# Patient Record
Sex: FEMALE | Race: BLACK OR AFRICAN AMERICAN | NOT HISPANIC OR LATINO | Employment: PART TIME | URBAN - METROPOLITAN AREA
[De-identification: names, ages, dates, MRNs, and addresses within clinical notes are randomized per-mention and may not be internally consistent; named-entity substitution may affect disease eponyms.]

---

## 2017-06-26 ENCOUNTER — HISTORICAL (OUTPATIENT)
Dept: LAB | Facility: HOSPITAL | Age: 22
End: 2017-06-26

## 2017-08-23 LAB
GLUCOSE UR QL STRIP: NEGATIVE
PROTEIN, POC: NEGATIVE

## 2018-07-19 ENCOUNTER — HISTORICAL (OUTPATIENT)
Dept: RADIOLOGY | Facility: HOSPITAL | Age: 23
End: 2018-07-19

## 2022-04-09 ENCOUNTER — HISTORICAL (OUTPATIENT)
Dept: ADMINISTRATIVE | Facility: HOSPITAL | Age: 27
End: 2022-04-09

## 2022-04-25 VITALS
WEIGHT: 185 LBS | SYSTOLIC BLOOD PRESSURE: 108 MMHG | HEIGHT: 65 IN | DIASTOLIC BLOOD PRESSURE: 66 MMHG | BODY MASS INDEX: 30.82 KG/M2

## 2022-09-15 ENCOUNTER — HISTORICAL (OUTPATIENT)
Dept: ADMINISTRATIVE | Facility: HOSPITAL | Age: 27
End: 2022-09-15

## 2022-10-12 ENCOUNTER — LAB VISIT (OUTPATIENT)
Dept: LAB | Facility: HOSPITAL | Age: 27
End: 2022-10-12
Attending: OBSTETRICS & GYNECOLOGY
Payer: MEDICAID

## 2022-10-12 DIAGNOSIS — Z20.2 POSSIBLE EXPOSURE TO STD: ICD-10-CM

## 2022-10-12 LAB
HAV IGM SERPL QL IA: NONREACTIVE
HBV CORE IGM SERPL QL IA: NONREACTIVE
HBV SURFACE AG SERPL QL IA: NONREACTIVE
HCV AB SERPL QL IA: NONREACTIVE
HIV 1+2 AB+HIV1 P24 AG SERPL QL IA: NONREACTIVE
T PALLIDUM AB SER QL: NONREACTIVE

## 2022-10-12 PROCEDURE — 36415 COLL VENOUS BLD VENIPUNCTURE: CPT

## 2022-10-12 PROCEDURE — 87389 HIV-1 AG W/HIV-1&-2 AB AG IA: CPT

## 2022-10-12 PROCEDURE — 86780 TREPONEMA PALLIDUM: CPT

## 2022-10-12 PROCEDURE — 80074 ACUTE HEPATITIS PANEL: CPT

## 2022-10-14 LAB — PATH REV: NORMAL

## 2022-11-10 ENCOUNTER — HOSPITAL ENCOUNTER (OUTPATIENT)
Dept: RADIOLOGY | Facility: HOSPITAL | Age: 27
Discharge: HOME | End: 2022-11-10
Attending: OBSTETRICS & GYNECOLOGY
Payer: MEDICAID

## 2022-11-10 VITALS — HEIGHT: 65 IN | WEIGHT: 208 LBS | BODY MASS INDEX: 34.66 KG/M2

## 2022-11-10 DIAGNOSIS — N64.4 BREAST PAIN, LEFT: ICD-10-CM

## 2022-11-10 PROCEDURE — 76642 ULTRASOUND BREAST LIMITED: CPT | Mod: TC,50

## 2022-11-10 PROCEDURE — 76642 ULTRASOUND BREAST LIMITED: CPT | Mod: 26,50,, | Performed by: STUDENT IN AN ORGANIZED HEALTH CARE EDUCATION/TRAINING PROGRAM

## 2022-11-10 PROCEDURE — 76642 US BREAST BILATERAL LIMITED: ICD-10-PCS | Mod: 26,50,, | Performed by: STUDENT IN AN ORGANIZED HEALTH CARE EDUCATION/TRAINING PROGRAM

## 2023-01-16 ENCOUNTER — LAB VISIT (OUTPATIENT)
Dept: LAB | Facility: HOSPITAL | Age: 28
End: 2023-01-16
Attending: OBSTETRICS & GYNECOLOGY
Payer: MEDICAID

## 2023-01-16 DIAGNOSIS — Z03.89 ENCNTR FOR OBS FOR OTH SUSPECTED DISEASES AND COND RULED OUT: ICD-10-CM

## 2023-01-16 DIAGNOSIS — O09.92 ENCOUNTER FOR SUPERVISION OF HIGH RISK PREGNANCY IN SECOND TRIMESTER, ANTEPARTUM: ICD-10-CM

## 2023-01-16 LAB
AMPHET UR QL SCN: NEGATIVE
BARBITURATE SCN PRESENT UR: NEGATIVE
BASOPHILS # BLD AUTO: 0.02 X10(3)/MCL (ref 0–0.2)
BASOPHILS NFR BLD AUTO: 0.2 %
BENZODIAZ UR QL SCN: NEGATIVE
CANNABINOIDS UR QL SCN: NEGATIVE
COCAINE UR QL SCN: NEGATIVE
EOSINOPHIL # BLD AUTO: 0.12 X10(3)/MCL (ref 0–0.9)
EOSINOPHIL NFR BLD AUTO: 1.3 %
ERYTHROCYTE [DISTWIDTH] IN BLOOD BY AUTOMATED COUNT: 12.9 % (ref 11.5–17)
GROUP & RH: NORMAL
HBV SURFACE AG SERPL QL IA: NONREACTIVE
HCT VFR BLD AUTO: 39.1 % (ref 37–47)
HCV AB SERPL QL IA: NONREACTIVE
HGB BLD-MCNC: 12.4 GM/DL (ref 12–16)
HIV 1+2 AB+HIV1 P24 AG SERPL QL IA: NONREACTIVE
IMM GRANULOCYTES # BLD AUTO: 0.03 X10(3)/MCL (ref 0–0.04)
IMM GRANULOCYTES NFR BLD AUTO: 0.3 %
INDIRECT COOMBS GEL: NORMAL
LYMPHOCYTES # BLD AUTO: 1.66 X10(3)/MCL (ref 0.6–4.6)
LYMPHOCYTES NFR BLD AUTO: 18.6 %
MCH RBC QN AUTO: 30.5 PG
MCHC RBC AUTO-ENTMCNC: 31.7 MG/DL (ref 33–36)
MCV RBC AUTO: 96.3 FL (ref 80–94)
MONOCYTES # BLD AUTO: 0.58 X10(3)/MCL (ref 0.1–1.3)
MONOCYTES NFR BLD AUTO: 6.5 %
NEUTROPHILS # BLD AUTO: 6.52 X10(3)/MCL (ref 2.1–9.2)
NEUTROPHILS NFR BLD AUTO: 73.1 %
OPIATES UR QL SCN: NEGATIVE
PCP UR QL: NEGATIVE
PH UR: 6 [PH] (ref 3–11)
PLATELET # BLD AUTO: 255 X10(3)/MCL (ref 130–400)
PMV BLD AUTO: 9.8 FL (ref 7.4–10.4)
RBC # BLD AUTO: 4.06 X10(6)/MCL (ref 4.2–5.4)
T PALLIDUM AB SER QL: NONREACTIVE
T4 FREE SERPL-MCNC: 0.91 NG/DL (ref 0.7–1.48)
TSH SERPL-ACNC: 0.49 UIU/ML (ref 0.35–4.94)
WBC # SPEC AUTO: 8.9 X10(3)/MCL (ref 4.5–11.5)

## 2023-01-16 PROCEDURE — 80307 DRUG TEST PRSMV CHEM ANLYZR: CPT

## 2023-01-16 PROCEDURE — 84439 ASSAY OF FREE THYROXINE: CPT

## 2023-01-16 PROCEDURE — 83789 MASS SPECTROMETRY QUAL/QUAN: CPT

## 2023-01-16 PROCEDURE — 86900 BLOOD TYPING SEROLOGIC ABO: CPT | Performed by: OBSTETRICS & GYNECOLOGY

## 2023-01-16 PROCEDURE — 83020 HEMOGLOBIN ELECTROPHORESIS: CPT

## 2023-01-16 PROCEDURE — 86787 VARICELLA-ZOSTER ANTIBODY: CPT

## 2023-01-16 PROCEDURE — 86762 RUBELLA ANTIBODY: CPT

## 2023-01-16 PROCEDURE — 87340 HEPATITIS B SURFACE AG IA: CPT

## 2023-01-16 PROCEDURE — 87389 HIV-1 AG W/HIV-1&-2 AB AG IA: CPT

## 2023-01-16 PROCEDURE — 84443 ASSAY THYROID STIM HORMONE: CPT

## 2023-01-16 PROCEDURE — 85025 COMPLETE CBC W/AUTO DIFF WBC: CPT

## 2023-01-16 PROCEDURE — 86803 HEPATITIS C AB TEST: CPT

## 2023-01-16 PROCEDURE — 36415 COLL VENOUS BLD VENIPUNCTURE: CPT

## 2023-01-16 PROCEDURE — 86780 TREPONEMA PALLIDUM: CPT

## 2023-01-16 PROCEDURE — 82664 ELECTROPHORETIC TEST: CPT

## 2023-01-17 LAB
RUBV IGG SERPL IA-ACNC: 0.6
RUBV IGG SERPL QL IA: NEGATIVE
VZV IGG SER IA-ACNC: 2.3
VZV IGG SER QL IA: POSITIVE

## 2023-01-18 LAB
HGB A MFR BLD ELPH: 96.8 % (ref 95.8–98)
HGB A2 MFR BLD: 2.7 % (ref 2–3.3)
HGB F MFR BLD: 0.5 % (ref 0–0.9)
HGB FRACT BLD ELPH-IMP: NORMAL
M HPLC HB VARIANT, B: NORMAL

## 2023-02-22 DIAGNOSIS — M79.662 BILATERAL CALF PAIN: Primary | ICD-10-CM

## 2023-02-22 DIAGNOSIS — M79.661 BILATERAL CALF PAIN: Primary | ICD-10-CM

## 2023-02-22 DIAGNOSIS — O09.92 SUPERVISION OF HIGH RISK PREGNANCY IN SECOND TRIMESTER: ICD-10-CM

## 2023-02-24 ENCOUNTER — HOSPITAL ENCOUNTER (OUTPATIENT)
Dept: CARDIOLOGY | Facility: HOSPITAL | Age: 28
Discharge: HOME | End: 2023-02-24
Attending: OBSTETRICS & GYNECOLOGY
Payer: MEDICAID

## 2023-02-24 DIAGNOSIS — M79.661 BILATERAL CALF PAIN: ICD-10-CM

## 2023-02-24 DIAGNOSIS — M79.662 BILATERAL CALF PAIN: ICD-10-CM

## 2023-02-24 DIAGNOSIS — O09.92 SUPERVISION OF HIGH RISK PREGNANCY IN SECOND TRIMESTER: ICD-10-CM

## 2023-02-24 PROCEDURE — 93971 EXTREMITY STUDY: CPT | Mod: LT

## 2023-03-30 ENCOUNTER — HOSPITAL ENCOUNTER (EMERGENCY)
Facility: HOSPITAL | Age: 28
Discharge: HOME | End: 2023-03-30
Attending: SPECIALIST
Payer: MEDICAID

## 2023-03-30 VITALS — BODY MASS INDEX: 35.16 KG/M2 | WEIGHT: 211 LBS | HEIGHT: 65 IN | TEMPERATURE: 98 F

## 2023-03-30 PROCEDURE — 99283 EMERGENCY DEPT VISIT LOW MDM: CPT

## 2023-03-30 RX ORDER — ASPIRIN 81 MG/1
81 TABLET ORAL DAILY
COMMUNITY

## 2023-03-30 NOTE — ED PROVIDER NOTES
"       MADDIE NOTE     Admit Date: 3/30/2023  MADDIE Physician: Lewis Tierney  Primary OBGYN: Dr. Lewis Knight    Admit Diagnosis/Chief Complaint:       Chief Complaint   Patient presents with    pelvic pain    Vaginal Pain       Hospital Course:  Arabella Toribio is a 27 y.o.  at 21w2d presents complaining of lower abdominal pressure pain shooting in the vagina area.  Also some mucousy discharge.  Patient was concerned because she is had previous  delivery    This IUP is complicated by history of  delivery.    Patient denies vaginal bleeding, leakage of fluid, contractions, headache, vision changes, RUQ pain, dysuria, fever, and nausea/vomiting.  Fetal Movement: normal.      Temp 98.3 °F (36.8 °C) (Oral)   Ht 5' 5" (1.651 m)   Wt 95.7 kg (211 lb)   LMP 10/22/2022   Breastfeeding No   BMI 35.11 kg/m²   Temp:  [98.3 °F (36.8 °C)] 98.3 °F (36.8 °C)    General: in no respiratory distress and acyanotic  Cardiovascular: regular rate and rhythm no murmurs  Respiratory: clear to auscultation, no wheezes, rales or rhonchi, symmetric air entry  Abdominal: fundus soft, mild tenderness in the suprapubic area consistent with round ligament pain 21 weeks size  Back: lumbar tenderness absent CVA tenderness none  Extremeties no redness or tenderness in the calves or thighs no edema    SSE:   SVE:  Cervix is long thick closed and posterior      FHT: Category 1  TOCO:  No contractions are noted    Medical Decision Making:  Patient is reassured.  Description of normal expected discomforts in pregnancy versus signs and symptoms of  labor have been discussed.  Questions were answered to her satisfaction.    LABS:   No results found for this or any previous visit (from the past 24 hour(s)).  [unfilled]     Imaging Results    None          ASSESMENT: Arabella Toribio is a 27 y.o.   at 21w2d with abdominal pain or round ligament pain of pregnancy    Discharge Diagnosis/Clinical " Impression:  Pregnancy 21 weeks.  Abdominal pain or round ligament pain    Status:Improved/stable    Disposition:  discharged to home    Medications:   Tylenol for discomfort    Patient Instructions:   Current Discharge Medication List        CONTINUE these medications which have NOT CHANGED    Details   aspirin (ECOTRIN) 81 MG EC tablet Take 81 mg by mouth once daily.      prenatal 93-iron-folate 9-dha (TRISTART DHA) 31 mg iron- 1 mg-200 mg Cap Take 1 capsule daily  Qty: 30 capsule, Refills: 11    Associated Diagnoses: Encounter for supervision of high risk pregnancy in second trimester, antepartum; Encntr for obs for oth suspected diseases and cond ruled out      promethazine (PHENERGAN) 25 MG suppository Place 1 suppository (25 mg total) rectally every 6 (six) hours as needed for Nausea.  Qty: 12 suppository, Refills: 1    Associated Diagnoses: Encounter for supervision of high risk pregnancy in second trimester, antepartum      boric acid (PH-D) 600 mg vaginal suppository Insert 1 supp. Vaginally every night at bedtime for 7 days and then insert 1 supp. Vaginally twice weekly as needed  Qty: 30 suppository, Refills: 6    Associated Diagnoses: BV (bacterial vaginosis)      metroNIDAZOLE (METROGEL) 0.75 % (37.5mg/5 gram) vaginal gel Place 1 applicator vaginally every evening.  Qty: 70 g, Refills: 0    Associated Diagnoses: BV (bacterial vaginosis)      valACYclovir (VALTREX) 500 MG tablet Take 1 tablet (500 mg total) by mouth 2 (two) times daily. for 3 days  Qty: 6 tablet, Refills: 0    Associated Diagnoses: HSV infection             - Pt was given routine pregnancy instructions including to return to triage if she had any vaginal bleeding (other than spotting for the next 48hrs), any loss of fluid like her water broke, decreased fetal movement, or contractions Q 5min lasting for 2 or more hours. Pt was also instructed to drink copious water. Patient voiced understanding of all these instructions and was  subsequently discharged home.    She will follow up with her primary OB.    No discharge procedures on file.    Lewis Tierney MD  OB-GYN Hospitalist       Lewis Tierney MD  03/30/23 [id number]

## 2023-03-30 NOTE — ED TRIAGE NOTES
IUP 21.2 weeks here with complaints of pelvic pain and vaginal pain that started 2 days ago that comes and goes and is sharp in the vagina - Patient denies vaginal bleeding or leaking fluids -Patient states she is having some white/clear discharge - Patient is having +FM

## 2023-03-30 NOTE — DISCHARGE INSTRUCTIONS
Come back to labor and delivery if you are having any vaginal bleeding, leaking fluid, contractions or decreased fetal movement.

## 2023-05-26 ENCOUNTER — HOSPITAL ENCOUNTER (EMERGENCY)
Facility: HOSPITAL | Age: 28
Discharge: HOME | End: 2023-05-26
Attending: SPECIALIST
Payer: MEDICAID

## 2023-05-26 VITALS
TEMPERATURE: 98 F | DIASTOLIC BLOOD PRESSURE: 67 MMHG | SYSTOLIC BLOOD PRESSURE: 116 MMHG | HEART RATE: 98 BPM | OXYGEN SATURATION: 95 %

## 2023-05-26 PROCEDURE — 99284 EMERGENCY DEPT VISIT MOD MDM: CPT

## 2023-05-26 RX ORDER — PROGESTERONE 200 MG/1
200 CAPSULE ORAL NIGHTLY
COMMUNITY
Start: 2023-05-17

## 2023-05-26 NOTE — ED PROVIDER NOTES
MADDIE NOTE     Admit Date: 2023  MADDIE Physician: Lewis Tierney  Primary OBGYN: Dr. Lewis Knight    Admit Diagnosis/Chief Complaint:       No chief complaint on file.      Hospital Course:  Arabella Toribio is a 27 y.o.  at 29w3d presents complaining of lower abdominal pain shooting into the vaginal area and low back pain and cramping.  Patient reports an occasional tightening of her abdomen but no regular pattern.  She reports that this is infrequent.  She denies any other problems at this time.    This IUP is complicated by history of  labor at 29 weeks.    Patient denies vaginal bleeding, leakage of fluid, headache, vision changes, RUQ pain, dysuria, fever, and nausea/vomiting.  Fetal Movement: normal.      /67   Pulse 106   LMP 10/22/2022   SpO2 99%   Pulse:  [106] 106  SpO2:  [99 %] 99 %  BP: (116)/(67) 116/67    General: in no apparent distress in no respiratory distress and acyanotic  Cardiovascular: regular rate and rhythm no murmurs  Respiratory: clear to auscultation, no wheezes, rales or rhonchi, symmetric air entry  Abdominal: fundus soft, nontender except for suprapubic area consistent with fetal presenting part.  Thirty weeks size  Back: lumbar tenderness present CVA tenderness none  Extremeties no redness or tenderness in the calves or thighs no edema    SSE:   SVE:  Closed, 30% effaced, posterior, membranes intact      FHT: Category 1  TOCO:  No contractions apparent    Medical Decision Making:  Patient will be placed on continuous fetal monitoring.  If no contractions and reassuring fetal heart rate tracing we will allow her to be discharged home with precautions.    LABS:   No results found for this or any previous visit (from the past 24 hour(s)).  [unfilled]     Imaging Results    None          ASSESMENT: Arabella Toribio is a 27 y.o.   at 29w3d with 3rd trimester pregnancy discomforts consistent with lower abdominal pain and low back  pain.    Discharge Diagnosis/Clinical Impression:  Pregnancy 29 weeks.  Abdominal pain of pregnancy 3rd trimester.  Back pain of pregnancy 3rd trimester.    Status:Improved/stable    Disposition:  discharged to home    Medications:       Patient Instructions:   Current Discharge Medication List        CONTINUE these medications which have NOT CHANGED    Details   aspirin (ECOTRIN) 81 MG EC tablet Take 81 mg by mouth once daily.      boric acid (PH-D) 600 mg vaginal suppository Insert 1 supp. Vaginally every night at bedtime for 7 days and then insert 1 supp. Vaginally twice weekly as needed  Qty: 30 suppository, Refills: 6    Associated Diagnoses: BV (bacterial vaginosis)      metroNIDAZOLE (METROGEL) 0.75 % (37.5mg/5 gram) vaginal gel Place 1 applicator vaginally every evening.  Qty: 70 g, Refills: 0    Associated Diagnoses: BV (bacterial vaginosis)      prenatal 93-iron-folate 9-dha (TRISTART DHA) 31 mg iron- 1 mg-200 mg Cap Take 1 capsule daily  Qty: 30 capsule, Refills: 11    Associated Diagnoses: Encounter for supervision of high risk pregnancy in second trimester, antepartum; Encntr for obs for oth suspected diseases and cond ruled out      promethazine (PHENERGAN) 25 MG suppository Place 1 suppository (25 mg total) rectally every 6 (six) hours as needed for Nausea.  Qty: 12 suppository, Refills: 1    Associated Diagnoses: Encounter for supervision of high risk pregnancy in second trimester, antepartum      valACYclovir (VALTREX) 500 MG tablet Take 1 tablet (500 mg total) by mouth 2 (two) times daily. for 3 days  Qty: 6 tablet, Refills: 0    Associated Diagnoses: HSV infection             - Pt was given routine pregnancy instructions including to return to triage if she had any vaginal bleeding (other than spotting for the next 48hrs), any loss of fluid like her water broke, decreased fetal movement, or contractions Q 5min lasting for 2 or more hours. Pt was also instructed to drink copious water. Patient  voiced understanding of all these instructions and was subsequently discharged home.    She will follow up with her primary OB.    No discharge procedures on file.    Lewis Tierney MD  OB-GYN Hospitalist       Lewis Tierney MD  05/26/23 [id number]

## 2023-06-08 ENCOUNTER — HOSPITAL ENCOUNTER (EMERGENCY)
Facility: HOSPITAL | Age: 28
Discharge: HOME | End: 2023-06-08
Attending: SPECIALIST
Payer: MEDICAID

## 2023-06-08 VITALS
DIASTOLIC BLOOD PRESSURE: 65 MMHG | SYSTOLIC BLOOD PRESSURE: 125 MMHG | RESPIRATION RATE: 16 BRPM | OXYGEN SATURATION: 97 % | HEART RATE: 115 BPM | TEMPERATURE: 98 F

## 2023-06-08 PROCEDURE — 99284 EMERGENCY DEPT VISIT MOD MDM: CPT

## 2023-06-08 NOTE — ED PROVIDER NOTES
MADDIE NOTE     Admit Date: 2023  MADDIE Physician: Lewis Tierney  Primary OBGYN: Dr. Lewis Knight    Admit Diagnosis/Chief Complaint:       Chief Complaint   Patient presents with    Vaginal Bleeding     Patient arrives with compliant of vaginal bleeding that started this morning at 0641.       Hospital Course:  Arabella Toribio is a 27 y.o.  at 31w2d presents complaining of episode of vaginal bleeding that occurred earlier this morning when she was wiping herself.  No bleeding since that time.  Patient denies any other problems at this time.    This IUP is complicated by history of  delivery at 29 weeks.    Patient denies leakage of fluid, contractions, headache, vision changes, RUQ pain, dysuria, fever, and nausea/vomiting.  Fetal Movement: normal.      /65   Pulse (!) 115   Temp 98.2 °F (36.8 °C)   Resp 16   LMP 10/22/2022   SpO2 97%   Temp:  [98.2 °F (36.8 °C)] 98.2 °F (36.8 °C)  Pulse:  [115] 115  Resp:  [16] 16  SpO2:  [97 %] 97 %  BP: (125)/(65) 125/65    General: in no respiratory distress and acyanotic  Cardiovascular: regular rate and rhythm no murmurs  Respiratory: clear to auscultation, no wheezes, rales or rhonchi, symmetric air entry  Abdominal: fundus soft, nontender 32 weeks size  Back: lumbar tenderness present CVA tenderness none  Extremeties no redness or tenderness in the calves or thighs no edema    SSE:   SVE:  External vulva with no apparent lesions or bleeding.  Slightly erythematous.  Vaginal exam reveals a white clumpy discharge but no evidence of any discoloration consistent with previous or active bleeding.  Excess long thick closed and posterior.      FHT: Category 1  TOCO:  No contractions.    Medical Decision Making:  Bleeding is likely from external source.  No evidence of intravaginal origin of bleeding.  Patient has been reassured and precautions were discussed.  Treatment of a yeast infection was also discussed.  Questions were answered to her  satisfaction.    LABS:   No results found for this or any previous visit (from the past 24 hour(s)).  [unfilled]     Imaging Results    None          ASSESMENT: Arabella Toribio is a 27 y.o.   at 31w2d with vaginal spotting probably secondary to yeast vulvovaginitis.  No evidence of active bleeding.    Discharge Diagnosis/Clinical Impression:  Pregnancy 31 weeks.  Spotting during 3rd trimester pregnancy.  Yeast vulvovaginitis.    Status:Improved/stable    Disposition:  discharged to home    Medications:   Over-the-counter yeast medication    Patient Instructions:   Current Discharge Medication List        CONTINUE these medications which have NOT CHANGED    Details   aspirin (ECOTRIN) 81 MG EC tablet Take 81 mg by mouth once daily.      prenatal 93-iron-folate 9-dha (TRISTART DHA) 31 mg iron- 1 mg-200 mg Cap Take 1 capsule daily  Qty: 30 capsule, Refills: 11    Associated Diagnoses: Encounter for supervision of high risk pregnancy in second trimester, antepartum; Encntr for obs for oth suspected diseases and cond ruled out      boric acid (PH-D) 600 mg vaginal suppository Insert 1 supp. Vaginally every night at bedtime for 7 days and then insert 1 supp. Vaginally twice weekly as needed  Qty: 30 suppository, Refills: 6    Associated Diagnoses: BV (bacterial vaginosis)      metroNIDAZOLE (METROGEL) 0.75 % (37.5mg/5 gram) vaginal gel Place 1 applicator vaginally every evening.  Qty: 70 g, Refills: 0    Associated Diagnoses: BV (bacterial vaginosis)      progesterone (PROMETRIUM) 200 MG capsule Take 200 mg by mouth every evening.      promethazine (PHENERGAN) 25 MG suppository Place 1 suppository (25 mg total) rectally every 6 (six) hours as needed for Nausea.  Qty: 12 suppository, Refills: 1    Associated Diagnoses: Encounter for supervision of high risk pregnancy in second trimester, antepartum      valACYclovir (VALTREX) 500 MG tablet Take 1 tablet (500 mg total) by mouth 2 (two) times daily.  for 3 days  Qty: 6 tablet, Refills: 0    Associated Diagnoses: HSV infection             - Pt was given routine pregnancy instructions including to return to triage if she had any vaginal bleeding (other than spotting for the next 48hrs), any loss of fluid like her water broke, decreased fetal movement, or contractions Q 5min lasting for 2 or more hours. Pt was also instructed to drink copious water. Patient voiced understanding of all these instructions and was subsequently discharged home.    She will follow up with her primary OB.    No discharge procedures on file.    Lewis Tierney MD  OB-GYN Hospitalist       Lewis Tierney MD  06/08/23 [id number]

## 2023-06-08 NOTE — DISCHARGE INSTRUCTIONS
Patient discharged home with instructions to follow up with  @ next scheduled visit. Patient to use over the counter Monistat to treat yeast infection.

## 2023-07-24 ENCOUNTER — LAB VISIT (OUTPATIENT)
Dept: LAB | Facility: HOSPITAL | Age: 28
End: 2023-07-24
Attending: OBSTETRICS & GYNECOLOGY
Payer: MEDICAID

## 2023-07-24 DIAGNOSIS — Z34.83 ENCOUNTER FOR SUPERVISION OF NORMAL PREGNANCY IN MULTIGRAVIDA IN THIRD TRIMESTER: ICD-10-CM

## 2023-07-24 LAB
BASOPHILS # BLD AUTO: 0.03 X10(3)/MCL
BASOPHILS NFR BLD AUTO: 0.4 %
EOSINOPHIL # BLD AUTO: 0.12 X10(3)/MCL (ref 0–0.9)
EOSINOPHIL NFR BLD AUTO: 1.5 %
ERYTHROCYTE [DISTWIDTH] IN BLOOD BY AUTOMATED COUNT: 14.3 % (ref 11.5–17)
HCT VFR BLD AUTO: 37.3 % (ref 37–47)
HGB BLD-MCNC: 12.1 G/DL (ref 12–16)
HIV 1+2 AB+HIV1 P24 AG SERPL QL IA: NONREACTIVE
IMM GRANULOCYTES # BLD AUTO: 0.02 X10(3)/MCL (ref 0–0.04)
IMM GRANULOCYTES NFR BLD AUTO: 0.2 %
LYMPHOCYTES # BLD AUTO: 1.84 X10(3)/MCL (ref 0.6–4.6)
LYMPHOCYTES NFR BLD AUTO: 22.4 %
MCH RBC QN AUTO: 31.3 PG (ref 27–31)
MCHC RBC AUTO-ENTMCNC: 32.4 G/DL (ref 33–36)
MCV RBC AUTO: 96.6 FL (ref 80–94)
MONOCYTES # BLD AUTO: 0.73 X10(3)/MCL (ref 0.1–1.3)
MONOCYTES NFR BLD AUTO: 8.9 %
NEUTROPHILS # BLD AUTO: 5.47 X10(3)/MCL (ref 2.1–9.2)
NEUTROPHILS NFR BLD AUTO: 66.6 %
PLATELET # BLD AUTO: 205 X10(3)/MCL (ref 130–400)
PMV BLD AUTO: 11.8 FL (ref 7.4–10.4)
RBC # BLD AUTO: 3.86 X10(6)/MCL (ref 4.2–5.4)
WBC # SPEC AUTO: 8.21 X10(3)/MCL (ref 4.5–11.5)

## 2023-07-24 PROCEDURE — 36415 COLL VENOUS BLD VENIPUNCTURE: CPT

## 2023-07-24 PROCEDURE — 86780 TREPONEMA PALLIDUM: CPT

## 2023-07-24 PROCEDURE — 87389 HIV-1 AG W/HIV-1&-2 AB AG IA: CPT

## 2023-07-24 PROCEDURE — 85025 COMPLETE CBC W/AUTO DIFF WBC: CPT

## 2023-07-25 ENCOUNTER — ANESTHESIA (OUTPATIENT)
Dept: OBSTETRICS AND GYNECOLOGY | Facility: HOSPITAL | Age: 28
End: 2023-07-25
Payer: MEDICAID

## 2023-07-25 ENCOUNTER — ANESTHESIA EVENT (OUTPATIENT)
Dept: OBSTETRICS AND GYNECOLOGY | Facility: HOSPITAL | Age: 28
End: 2023-07-25
Payer: MEDICAID

## 2023-07-25 ENCOUNTER — HOSPITAL ENCOUNTER (INPATIENT)
Facility: HOSPITAL | Age: 28
LOS: 2 days | Discharge: HOME | End: 2023-07-27
Attending: OBSTETRICS & GYNECOLOGY | Admitting: OBSTETRICS & GYNECOLOGY
Payer: MEDICAID

## 2023-07-25 VITALS — RESPIRATION RATE: 16 BRPM

## 2023-07-25 DIAGNOSIS — Z34.90 PREGNANCY: Primary | ICD-10-CM

## 2023-07-25 DIAGNOSIS — Z34.90 ENCOUNTER FOR INDUCTION OF LABOR: ICD-10-CM

## 2023-07-25 LAB
BASOPHILS # BLD AUTO: 0.02 X10(3)/MCL
BASOPHILS NFR BLD AUTO: 0.2 %
EOSINOPHIL # BLD AUTO: 0.08 X10(3)/MCL (ref 0–0.9)
EOSINOPHIL NFR BLD AUTO: 0.9 %
ERYTHROCYTE [DISTWIDTH] IN BLOOD BY AUTOMATED COUNT: 14.3 % (ref 11.5–17)
GROUP & RH: NORMAL
HCT VFR BLD AUTO: 38 % (ref 37–47)
HGB BLD-MCNC: 12.7 G/DL (ref 12–16)
IMM GRANULOCYTES # BLD AUTO: 0.03 X10(3)/MCL (ref 0–0.04)
IMM GRANULOCYTES NFR BLD AUTO: 0.4 %
INDIRECT COOMBS GEL: NORMAL
LYMPHOCYTES # BLD AUTO: 1.9 X10(3)/MCL (ref 0.6–4.6)
LYMPHOCYTES NFR BLD AUTO: 22.5 %
MCH RBC QN AUTO: 31.7 PG (ref 27–31)
MCHC RBC AUTO-ENTMCNC: 33.4 G/DL (ref 33–36)
MCV RBC AUTO: 94.8 FL (ref 80–94)
MONOCYTES # BLD AUTO: 0.7 X10(3)/MCL (ref 0.1–1.3)
MONOCYTES NFR BLD AUTO: 8.3 %
NEUTROPHILS # BLD AUTO: 5.72 X10(3)/MCL (ref 2.1–9.2)
NEUTROPHILS NFR BLD AUTO: 67.7 %
NRBC BLD AUTO-RTO: 0 %
PLATELET # BLD AUTO: 191 X10(3)/MCL (ref 130–400)
PMV BLD AUTO: 12.1 FL (ref 7.4–10.4)
RBC # BLD AUTO: 4.01 X10(6)/MCL (ref 4.2–5.4)
SPECIMEN OUTDATE: NORMAL
T PALLIDUM AB SER QL: NONREACTIVE
T PALLIDUM AB SER QL: NONREACTIVE
WBC # SPEC AUTO: 8.45 X10(3)/MCL (ref 4.5–11.5)

## 2023-07-25 PROCEDURE — 62326 NJX INTERLAMINAR LMBR/SAC: CPT | Performed by: ANESTHESIOLOGY

## 2023-07-25 PROCEDURE — 86900 BLOOD TYPING SEROLOGIC ABO: CPT | Performed by: OBSTETRICS & GYNECOLOGY

## 2023-07-25 PROCEDURE — 59409 PRA ETRICAL CARE,VAG DELIV ONLY: ICD-10-PCS | Mod: AA,,, | Performed by: ANESTHESIOLOGY

## 2023-07-25 PROCEDURE — 86780 TREPONEMA PALLIDUM: CPT | Performed by: OBSTETRICS & GYNECOLOGY

## 2023-07-25 PROCEDURE — 85025 COMPLETE CBC W/AUTO DIFF WBC: CPT | Performed by: OBSTETRICS & GYNECOLOGY

## 2023-07-25 PROCEDURE — 11000001 HC ACUTE MED/SURG PRIVATE ROOM

## 2023-07-25 PROCEDURE — 25000003 PHARM REV CODE 250: Performed by: ANESTHESIOLOGY

## 2023-07-25 PROCEDURE — 99999 HC NO LEVEL OF SERVICE - ED ONLY: CPT

## 2023-07-25 PROCEDURE — 63600175 PHARM REV CODE 636 W HCPCS: Performed by: OBSTETRICS & GYNECOLOGY

## 2023-07-25 PROCEDURE — 25000003 PHARM REV CODE 250: Performed by: OBSTETRICS & GYNECOLOGY

## 2023-07-25 PROCEDURE — 51702 INSERT TEMP BLADDER CATH: CPT

## 2023-07-25 PROCEDURE — 59409 OBSTETRICAL CARE: CPT | Mod: AA,,, | Performed by: ANESTHESIOLOGY

## 2023-07-25 PROCEDURE — 94761 N-INVAS EAR/PLS OXIMETRY MLT: CPT

## 2023-07-25 PROCEDURE — 72200005 HC VAGINAL DELIVERY LEVEL II

## 2023-07-25 PROCEDURE — 72100002 HC LABOR CARE, 1ST 8 HOURS

## 2023-07-25 PROCEDURE — 63600175 PHARM REV CODE 636 W HCPCS: Performed by: ANESTHESIOLOGY

## 2023-07-25 PROCEDURE — 99900035 HC TECH TIME PER 15 MIN (STAT)

## 2023-07-25 RX ORDER — OXYTOCIN/RINGER'S LACTATE 30/500 ML
334 PLASTIC BAG, INJECTION (ML) INTRAVENOUS ONCE
Status: DISCONTINUED | OUTPATIENT
Start: 2023-07-25 | End: 2023-07-27 | Stop reason: HOSPADM

## 2023-07-25 RX ORDER — OXYTOCIN/RINGER'S LACTATE 30/500 ML
95 PLASTIC BAG, INJECTION (ML) INTRAVENOUS ONCE
Status: DISCONTINUED | OUTPATIENT
Start: 2023-07-25 | End: 2023-07-27 | Stop reason: HOSPADM

## 2023-07-25 RX ORDER — OXYTOCIN/RINGER'S LACTATE 30/500 ML
334 PLASTIC BAG, INJECTION (ML) INTRAVENOUS ONCE AS NEEDED
Status: DISCONTINUED | OUTPATIENT
Start: 2023-07-25 | End: 2023-07-27 | Stop reason: HOSPADM

## 2023-07-25 RX ORDER — OXYTOCIN/RINGER'S LACTATE 30/500 ML
95 PLASTIC BAG, INJECTION (ML) INTRAVENOUS ONCE AS NEEDED
Status: DISCONTINUED | OUTPATIENT
Start: 2023-07-25 | End: 2023-07-27 | Stop reason: HOSPADM

## 2023-07-25 RX ORDER — CARBOPROST TROMETHAMINE 250 UG/ML
250 INJECTION, SOLUTION INTRAMUSCULAR
Status: DISCONTINUED | OUTPATIENT
Start: 2023-07-25 | End: 2023-07-27 | Stop reason: HOSPADM

## 2023-07-25 RX ORDER — LIDOCAINE HYDROCHLORIDE 10 MG/ML
10 INJECTION INFILTRATION; PERINEURAL ONCE AS NEEDED
Status: DISCONTINUED | OUTPATIENT
Start: 2023-07-25 | End: 2023-07-27 | Stop reason: HOSPADM

## 2023-07-25 RX ORDER — HYDROCORTISONE 25 MG/G
CREAM TOPICAL 3 TIMES DAILY PRN
Status: DISCONTINUED | OUTPATIENT
Start: 2023-07-25 | End: 2023-07-27 | Stop reason: HOSPADM

## 2023-07-25 RX ORDER — MISOPROSTOL 100 UG/1
800 TABLET ORAL ONCE AS NEEDED
Status: DISCONTINUED | OUTPATIENT
Start: 2023-07-25 | End: 2023-07-27 | Stop reason: HOSPADM

## 2023-07-25 RX ORDER — CARBOPROST TROMETHAMINE 250 UG/ML
250 INJECTION, SOLUTION INTRAMUSCULAR
Status: CANCELLED | OUTPATIENT
Start: 2023-07-25

## 2023-07-25 RX ORDER — DIPHENHYDRAMINE HYDROCHLORIDE 50 MG/ML
25 INJECTION INTRAMUSCULAR; INTRAVENOUS EVERY 4 HOURS PRN
Status: DISCONTINUED | OUTPATIENT
Start: 2023-07-25 | End: 2023-07-27 | Stop reason: HOSPADM

## 2023-07-25 RX ORDER — IBUPROFEN 600 MG/1
600 TABLET ORAL EVERY 6 HOURS PRN
Status: DISCONTINUED | OUTPATIENT
Start: 2023-07-25 | End: 2023-07-27 | Stop reason: HOSPADM

## 2023-07-25 RX ORDER — METHYLERGONOVINE MALEATE 0.2 MG/ML
200 INJECTION INTRAVENOUS
Status: DISCONTINUED | OUTPATIENT
Start: 2023-07-25 | End: 2023-07-27 | Stop reason: HOSPADM

## 2023-07-25 RX ORDER — SODIUM CHLORIDE, SODIUM LACTATE, POTASSIUM CHLORIDE, CALCIUM CHLORIDE 600; 310; 30; 20 MG/100ML; MG/100ML; MG/100ML; MG/100ML
INJECTION, SOLUTION INTRAVENOUS CONTINUOUS
Status: DISCONTINUED | OUTPATIENT
Start: 2023-07-25 | End: 2023-07-27 | Stop reason: HOSPADM

## 2023-07-25 RX ORDER — FENTANYL/BUPIVACAINE/NS/PF 2-1250MCG
PLASTIC BAG, INJECTION (ML) INJECTION CONTINUOUS PRN
Status: DISCONTINUED | OUTPATIENT
Start: 2023-07-25 | End: 2023-07-25

## 2023-07-25 RX ORDER — METHYLERGONOVINE MALEATE 0.2 MG/ML
200 INJECTION INTRAVENOUS
Status: CANCELLED | OUTPATIENT
Start: 2023-07-25

## 2023-07-25 RX ORDER — PROCHLORPERAZINE EDISYLATE 5 MG/ML
5 INJECTION INTRAMUSCULAR; INTRAVENOUS EVERY 6 HOURS PRN
Status: DISCONTINUED | OUTPATIENT
Start: 2023-07-25 | End: 2023-07-27 | Stop reason: HOSPADM

## 2023-07-25 RX ORDER — ONDANSETRON 4 MG/1
8 TABLET, ORALLY DISINTEGRATING ORAL EVERY 8 HOURS PRN
Status: DISCONTINUED | OUTPATIENT
Start: 2023-07-25 | End: 2023-07-27 | Stop reason: HOSPADM

## 2023-07-25 RX ORDER — OXYTOCIN/RINGER'S LACTATE 30/500 ML
334 PLASTIC BAG, INJECTION (ML) INTRAVENOUS ONCE AS NEEDED
Status: COMPLETED | OUTPATIENT
Start: 2023-07-25 | End: 2023-07-25

## 2023-07-25 RX ORDER — OXYCODONE HYDROCHLORIDE 5 MG/1
10 TABLET ORAL EVERY 6 HOURS PRN
Status: DISCONTINUED | OUTPATIENT
Start: 2023-07-25 | End: 2023-07-27 | Stop reason: HOSPADM

## 2023-07-25 RX ORDER — CALCIUM CARBONATE 200(500)MG
500 TABLET,CHEWABLE ORAL 3 TIMES DAILY PRN
Status: DISCONTINUED | OUTPATIENT
Start: 2023-07-25 | End: 2023-07-27 | Stop reason: HOSPADM

## 2023-07-25 RX ORDER — BUPIVACAINE HYDROCHLORIDE 2.5 MG/ML
INJECTION, SOLUTION EPIDURAL; INFILTRATION; INTRACAUDAL
Status: COMPLETED | OUTPATIENT
Start: 2023-07-25 | End: 2023-07-25

## 2023-07-25 RX ORDER — ACETAMINOPHEN 325 MG/1
650 TABLET ORAL EVERY 6 HOURS PRN
Status: DISCONTINUED | OUTPATIENT
Start: 2023-07-25 | End: 2023-07-27 | Stop reason: HOSPADM

## 2023-07-25 RX ORDER — PROMETHAZINE HYDROCHLORIDE 25 MG/1
25 TABLET ORAL EVERY 6 HOURS PRN
Status: DISCONTINUED | OUTPATIENT
Start: 2023-07-25 | End: 2023-07-27 | Stop reason: HOSPADM

## 2023-07-25 RX ORDER — SIMETHICONE 80 MG
1 TABLET,CHEWABLE ORAL EVERY 6 HOURS PRN
Status: DISCONTINUED | OUTPATIENT
Start: 2023-07-25 | End: 2023-07-27 | Stop reason: HOSPADM

## 2023-07-25 RX ORDER — SODIUM CHLORIDE 0.9 % (FLUSH) 0.9 %
10 SYRINGE (ML) INJECTION
Status: DISCONTINUED | OUTPATIENT
Start: 2023-07-25 | End: 2023-07-27 | Stop reason: HOSPADM

## 2023-07-25 RX ORDER — DOCUSATE SODIUM 100 MG/1
200 CAPSULE, LIQUID FILLED ORAL 2 TIMES DAILY PRN
Status: DISCONTINUED | OUTPATIENT
Start: 2023-07-25 | End: 2023-07-27 | Stop reason: HOSPADM

## 2023-07-25 RX ORDER — DIPHENOXYLATE HYDROCHLORIDE AND ATROPINE SULFATE 2.5; .025 MG/1; MG/1
1 TABLET ORAL 4 TIMES DAILY PRN
Status: CANCELLED | OUTPATIENT
Start: 2023-07-25

## 2023-07-25 RX ORDER — OXYTOCIN/RINGER'S LACTATE 30/500 ML
95 PLASTIC BAG, INJECTION (ML) INTRAVENOUS ONCE AS NEEDED
Status: DISCONTINUED | OUTPATIENT
Start: 2023-07-25 | End: 2023-07-25

## 2023-07-25 RX ORDER — OXYCODONE AND ACETAMINOPHEN 5; 325 MG/1; MG/1
1 TABLET ORAL EVERY 4 HOURS PRN
Status: DISCONTINUED | OUTPATIENT
Start: 2023-07-25 | End: 2023-07-27 | Stop reason: HOSPADM

## 2023-07-25 RX ORDER — OXYTOCIN 10 [USP'U]/ML
10 INJECTION, SOLUTION INTRAMUSCULAR; INTRAVENOUS ONCE AS NEEDED
Status: DISCONTINUED | OUTPATIENT
Start: 2023-07-25 | End: 2023-07-27 | Stop reason: HOSPADM

## 2023-07-25 RX ORDER — MISOPROSTOL 100 UG/1
800 TABLET ORAL
Status: CANCELLED | OUTPATIENT
Start: 2023-07-25

## 2023-07-25 RX ORDER — PRENATAL WITH FERROUS FUM AND FOLIC ACID 3080; 920; 120; 400; 22; 1.84; 3; 20; 10; 1; 12; 200; 27; 25; 2 [IU]/1; [IU]/1; MG/1; [IU]/1; MG/1; MG/1; MG/1; MG/1; MG/1; MG/1; UG/1; MG/1; MG/1; MG/1; MG/1
1 TABLET ORAL DAILY
Status: DISCONTINUED | OUTPATIENT
Start: 2023-07-26 | End: 2023-07-27 | Stop reason: HOSPADM

## 2023-07-25 RX ORDER — DIPHENHYDRAMINE HCL 25 MG
25 CAPSULE ORAL EVERY 4 HOURS PRN
Status: DISCONTINUED | OUTPATIENT
Start: 2023-07-25 | End: 2023-07-27 | Stop reason: HOSPADM

## 2023-07-25 RX ORDER — ONDANSETRON 2 MG/ML
4 INJECTION INTRAMUSCULAR; INTRAVENOUS EVERY 6 HOURS PRN
Status: DISCONTINUED | OUTPATIENT
Start: 2023-07-25 | End: 2023-07-27 | Stop reason: HOSPADM

## 2023-07-25 RX ORDER — SIMETHICONE 80 MG
1 TABLET,CHEWABLE ORAL 4 TIMES DAILY PRN
Status: DISCONTINUED | OUTPATIENT
Start: 2023-07-25 | End: 2023-07-25

## 2023-07-25 RX ADMIN — IBUPROFEN 600 MG: 600 TABLET ORAL at 06:07

## 2023-07-25 RX ADMIN — BUPIVACAINE HYDROCHLORIDE 5 ML: 2.5 INJECTION, SOLUTION EPIDURAL; INFILTRATION; INTRACAUDAL; PERINEURAL at 11:07

## 2023-07-25 RX ADMIN — Medication 334 MILLI-UNITS/MIN: at 04:07

## 2023-07-25 RX ADMIN — ONDANSETRON 4 MG: 2 INJECTION INTRAMUSCULAR; INTRAVENOUS at 03:07

## 2023-07-25 RX ADMIN — Medication 10 ML/HR: at 11:07

## 2023-07-25 RX ADMIN — Medication: at 06:07

## 2023-07-25 RX ADMIN — BUPIVACAINE HYDROCHLORIDE 10 ML: 2.5 INJECTION, SOLUTION EPIDURAL; INFILTRATION; INTRACAUDAL; PERINEURAL at 11:07

## 2023-07-25 NOTE — ANESTHESIA PROCEDURE NOTES
Epidural    Patient location during procedure: OB   Reason for block: primary anesthetic   Reason for block: labor analgesia requested by patient and obstetrician  Diagnosis: LABOR   Start time: 7/25/2023 11:10 AM  Timeout: 7/25/2023 11:10 AM  End time: 7/25/2023 11:30 AM    Staffing  Performing Provider: Lucius Palma MD  Authorizing Provider: Lucius Palma MD        Preanesthetic Checklist  Completed: patient identified, IV checked, site marked, risks and benefits discussed, surgical consent, monitors and equipment checked, pre-op evaluation, timeout performed, anesthesia consent given, hand hygiene performed and patient being monitored  Preparation  Patient position: sitting  Prep: ChloraPrep  Reason for block: primary anesthetic   Epidural  Skin Anesthetic: lidocaine 1%  Skin Wheal: 5 mL  Administration type: continuous  Approach: midline  Interspace: L4-5    Injection technique: NATACHA saline  Needle and Epidural Catheter  Needle type: Tuohy   Needle gauge: 17  Needle length: 3.5 inches  Catheter type: springwound and multi-orifice  Catheter size: 19 G  Insertion Attempts: 1  Test dose: 3 mL of lidocaine 1.5% with Epi 1-to-200,000  Additional Documentation: incremental injection, negative aspiration for heme and CSF and no paresthesia on injection  Needle localization: anatomical landmarks  Assessment  Ease of block: easy  Patient's tolerance of the procedure: comfortable throughout block  Additional Notes  Epidural placed at Obstetrician's request for Labor Analgesia  Informed consent: signed by patient.     Indication: obstetrical pain.       Sitting position, sterile preparation of site (in usual fashion, Chloraprep, allowed to dry according to  recommendations),   local anesthesia of 1% xylocaine 5 ml to skin, subq, & interspinous ligament with 25 ga 1.5 inch needle  Epidural approach midline, 17 gauge TOUHY needle (placed via loss of resistance technique saline c small compressible air  bubble),     Good loss of resistance on 1st pass with one redirection     Negative blood, negative csf, negative paresthesia on epidural needle placement  1 + 4 + 5 ml 0.25% Bupivacaine MPF through epidural needle  BRYON inserted, epidural needle removed  Negative blood, negative csf, negative paresthesia on BRYON placement  Negative test dose of BRYON with 3 ml 1.5% Xylocaine mpf c epinephrine 1/200,000  Loading dose BRYON 5 ml 0.25% Bupivacaine mpf    EPIDURAL INFUSION: 0.125% Bupivacaine with Fentanyl 2 mcg/ml at 10 ml/hr , PCEA 4 ml bolus Q 15 minutes,   FLUID BOLUS 500 ML OF LACTATED RINGERS PRIOR TO EPIDURAL PLACEMENT.     UTERINE DISPLACEMENT FLACO & AMBROSE AFTER EPIDURAL PLACEMENT.     Procedure tolerated: well.     Complications: None.       American Society of Anesthesiologists# (ASA) physical status classification: Class II.       No inadvertent dural puncture with Tuohy.      Medications:    Medications: bupivacaine (pf) (MARCAINE) injection 0.25% - Epidural   5 mL - 7/25/2023 11:22:00 AM   10 mL - 7/25/2023 11:23:00 AM

## 2023-07-25 NOTE — ED NOTES
[id number] patient was seen in the OB ED by the triage nurse and found to be in active labor and grossly ruptured.  She was directly admitted to labor and delivery from there.  She was not seen by the OB ED physician because it was unnecessary.     Lewis Tierney MD  07/25/23 1118

## 2023-07-25 NOTE — H&P
[facility] - Labor and Delivery  Obstetrics  History & Physical    Patient Name: Arabella Toribio  MRN: [id number]  Admission Date: 2023  Primary Care Provider: Elicia King NP    Subjective:     Principal Problem:srom labor mec    History of Present Illness: labor    Obstetric HPI:  Patient reports Date/time of onset: earlier today  contractions, active fetal movement, Yes vaginal bleeding , Yes loss of fluid     This pregnancy has been complicated by hx of ptl    OB History    Para Term  AB Living   2 1 0 1 0 1   SAB IAB Ectopic Multiple Live Births   0 0 0 0 1      # Outcome Date GA Lbr Yovany/2nd Weight Sex Delivery Anes PTL Lv   2 Current            1  10/15/17 29w0d   M Vag-Spont   RAEANN     No past medical history on file.  Past Surgical History:   Procedure Laterality Date    BREAST CYST INCISION AND DRAINAGE         PTA Medications   Medication Sig    aspirin (ECOTRIN) 81 MG EC tablet Take 81 mg by mouth once daily.    boric acid (PH-D) 600 mg vaginal suppository Insert 1 supp. Vaginally every night at bedtime for 7 days and then insert 1 supp. Vaginally twice weekly as needed (Patient not taking: Reported on 2023)    metroNIDAZOLE (METROGEL) 0.75 % (37.5mg/5 gram) vaginal gel Place 1 applicator vaginally every evening. (Patient not taking: Reported on 2023)    prenatal 93-iron-folate 9-dha (TRISTART DHA) 31 mg iron- 1 mg-200 mg Cap Take 1 capsule daily    progesterone (PROMETRIUM) 200 MG capsule Take 200 mg by mouth every evening.    promethazine (PHENERGAN) 25 MG suppository Place 1 suppository (25 mg total) rectally every 6 (six) hours as needed for Nausea.    valACYclovir (VALTREX) 500 MG tablet Take 1 tablet (500 mg total) by mouth 2 (two) times daily. for 3 days       Review of patient's allergies indicates:  No Known Allergies     Family History       Problem Relation (Age of Onset)    Lung cancer Maternal Grandmother, Maternal Grandfather, Other           Tobacco Use    Smoking status: Never    Smokeless tobacco: Never   Substance and Sexual Activity    Alcohol use: Not Currently    Drug use: Never    Sexual activity: Yes     Review of Systems   Objective:     Vital Signs (Most Recent):    Vital Signs (24h Range):  Resp:  [16] 16  BP: (108)/(67) 108/67     Weight: 99.8 kg (220 lb)  Body mass index is 36.61 kg/m².    FHT: 145Cat 1 (reassuring)  TOCO:  Q 5 minutes    Physical Exam    Cervix:  Dilation:  6  Effacement:  75%  Station: -2  Presentation: Vertex     Significant Labs:  Lab Results   Component Value Date    [facility]       I have personallly reviewed all pertinent lab results from the last 24 hours.    Assessment/Plan:     27 y.o. female  at 38w0d for:    There are no hospital problems to display for this patient.      Admit to lnd for labor      Lewis Knight MD  Obstetrics  [facility] - Labor and Delivery

## 2023-07-25 NOTE — L&D DELIVERY NOTE
[facility] - Labor and Delivery  Vaginal Delivery   Operative Note    SUMMARY     Normal spontaneous vaginal delivery of live infant, skin to skin was unable to be performed due to mec .  Infant delivered position OA over intact perineum.  Nuchal cord: No.    Spontaneous delivery of placenta and IV pitocin given noting good uterine tone.  1st degree laceration noted.  Patient tolerated delivery well. Sponge needle and lap counted correctly x2.    Indications: <principal problem not specified>  Pregnancy complicated by: There is no problem list on file for this patient.    Admitting GA: 38w0d    Delivery Information for Anderson Toribio    Birth information:  YOB: 2024   Time of birth: 4:38 PM   Sex: male   Head Delivery Date/Time: 7/25/2023  4:38 PM   Delivery type: Vaginal, Spontaneous   Gestational Age: 38w0d        Delivery Providers    Delivering clinician: Lewis Knight MD   Provider Role    Tamiko Martínez RN Registered Nurse    Marybeth Coronel, AIDEN Respiratory Therapist    Jayla Aguilera, RN Registered Nurse    Adriana Fernandez, RN Registered Nurse    Celia Liu RN Registered Nurse              Measurements    Weight:   Length:          Apgars    Living status: Living  Apgars:  1 min.:  5 min.:  10 min.:  15 min.:  20 min.:    Skin color:         Heart rate:         Reflex irritability:         Muscle tone:         Respiratory effort:         Total:                  Operative Delivery    Forceps attempted?: No  Vacuum extractor attempted?: No         Shoulder Dystocia    Shoulder dystocia present?: No           Presentation    Presentation: Vertex  Position: Left Occiput Anterior           Interventions/Resuscitation    Method: Bulb Suctioning       Cord    Vessels: 3 vessels  Complications: None  Delayed Cord Clamping?: No  Cord Blood Disposition: Sent with Baby  Gases Sent?: No  Cord Comments: mec delivery to warmer so no delayed cord c;lamping  Stem Cell Collection (by  MD): No       Placenta    Placenta delivery date/time: 2023 1640  Placenta removal: Spontaneous  Placenta appearance: Intact  Placenta disposition: Discarded           Labor Events:       labor: No     Labor Onset Date/Time: 2023 09:30     Dilation Complete Date/Time: 2023 15:38     Start Pushing Date/Time: 2023 16:05       Start Pushing Date/Time: 2023 16:05     Rupture Date/Time: 2330         Rupture type: SRM (Spontaneous Rupture)         Fluid Amount:       Fluid Color: Meconium Thin               steroids: None     Antibiotics given for GBS: No     Induction: none     Indications for induction:        Augmentation:       Indications for augmentation:       Labor complications: None     Additional complications:          Cervical ripening:                     Delivery:      Episiotomy: None     Indication for Episiotomy:       Perineal Lacerations: 1st Repaired:  Yes   Periurethral Laceration: left Repaired: Yes   Labial Laceration:   Repaired:     Sulcus Laceration:   Repaired:     Vaginal Laceration:   Repaired:     Cervical Laceration:   Repaired:     Repair suture: None     Repair # of packets: 1     Last Value - EBL - Nursing (mL):       Sum - EBL - Nursing (mL): 0     Last Value - EBL - Anesthesia (mL):      Calculated QBL (mL): 200      Vaginal Sweep Performed: Yes     Surgicount Correct: Yes     Vaginal Packing: No Quantity:       Other providers:       Anesthesia    Method: Epidural          Details (if applicable):  Trial of Labor      Categorization:      Priority:     Indications for :     Incision Type:       Additional  information:  Forceps:    Vacuum:    Breech:    Observed anomalies    Other (Comments):

## 2023-07-25 NOTE — ANESTHESIA PREPROCEDURE EVALUATION
2023  Arabella Toribio is a 27 y.o., female.  OB History    Para Term  AB Living   2 1 0 1   1   SAB IAB Ectopic Multiple Live Births           1      # Outcome Date GA Lbr Yovany/2nd Weight Sex Delivery Anes PTL Lv   2 Current            1  10/15/17 29w0d   M Vag-Spont   RAEANN     Pre-op Diagnosis: * No pre-op diagnosis entered *    * No procedures listed *     Review of patient's allergies indicates:  No Known Allergies    Current Outpatient Medications   Medication Instructions    aspirin (ECOTRIN) 81 mg, Oral, Daily    boric acid (PH-D) 600 mg vaginal suppository Insert 1 supp. Vaginally every night at bedtime for 7 days and then insert 1 supp. Vaginally twice weekly as needed    metroNIDAZOLE (METROGEL) 0.75 % (37.5mg/5 gram) vaginal gel 1 applicator, Vaginal, Nightly    prenatal 93-iron-folate 9-dha (TRISTART DHA) 31 mg iron- 1 mg-200 mg Cap Take 1 capsule daily    progesterone (PROMETRIUM) 200 mg, Oral, Nightly    promethazine (PHENERGAN) 25 mg, Rectal, Every 6 hours PRN    valACYclovir (VALTREX) 500 mg, Oral, 2 times daily       No past medical history on file.    Past Surgical History:   Procedure Laterality Date    BREAST CYST INCISION AND DRAINAGE       Recent Labs   Lab 23  1523 23  1046   WBC 8.21 8.45   RBC 3.86* 4.01*   HGB 12.1 12.7   HCT 37.3 38.0   MCV 96.6* 94.8*   MCH 31.3* 31.7*   MCHC 32.4* 33.4   RDW 14.3 14.3    191   MPV 11.8* 12.1*     Pre-op Assessment    I have reviewed the Patient Summary Reports.    I have reviewed the NPO Status.   I have reviewed the Medications.     Review of Systems  Anesthesia Hx:  No problems with previous Anesthesia  Denies Family Hx of Anesthesia complications.   Denies Personal Hx of Anesthesia complications.   Social:  Non-Smoker    Cardiovascular:   Exercise tolerance: good  Denies Angina.   Denies Orthopnea.  Denies PND.  Denies BELLAMY.  Functional Capacity good / => 4 METS    Musculoskeletal:  Musculoskeletal Normal    Neurological:   Denies TIA. Denies CVA.    Psych:  Psychiatric Normal           Physical Exam  General: Well nourished, Alert and Oriented    Airway:  Mallampati: III   Mouth Opening: Normal  TM Distance: Normal  Tongue: Normal  Neck ROM: Normal ROM    Dental:  Intact    Chest/Lungs:  Clear to auscultation    Heart:  Rate: Normal  Rhythm: Regular Rhythm        Anesthesia Plan  Type of Anesthesia, risks & benefits discussed:    Anesthesia Type: Epidural  Post Op Pain Control Plan: epidural analgesia  Informed Consent: Informed consent signed with the Patient and all parties understand the risks and agree with anesthesia plan.  All questions answered. Patient consented to blood products? Yes  ASA Score: 2  Day of Surgery Review of History & Physical: H&P Update referred to the surgeon/provider.    Ready For Surgery From Anesthesia Perspective.     .

## 2023-07-26 PROCEDURE — 11000001 HC ACUTE MED/SURG PRIVATE ROOM

## 2023-07-26 PROCEDURE — 25000003 PHARM REV CODE 250: Performed by: OBSTETRICS & GYNECOLOGY

## 2023-07-26 RX ADMIN — IBUPROFEN 600 MG: 600 TABLET ORAL at 12:07

## 2023-07-26 RX ADMIN — IBUPROFEN 600 MG: 600 TABLET ORAL at 05:07

## 2023-07-26 RX ADMIN — DOCUSATE SODIUM 200 MG: 100 CAPSULE, LIQUID FILLED ORAL at 05:07

## 2023-07-26 RX ADMIN — IBUPROFEN 600 MG: 600 TABLET ORAL at 06:07

## 2023-07-26 NOTE — PROGRESS NOTES
Pt seen this am no complaints.   Min lochia  Vss afebrile  Aao3  Abd nt nd uterus firm  Ext nt    Sp  pp1 doing well

## 2023-07-26 NOTE — ANESTHESIA POSTPROCEDURE EVALUATION
Anesthesia Post Evaluation    Patient: Arabella Toribio    Procedure(s) Performed: * No procedures listed *    Final Anesthesia Type: epidural      Patient location during evaluation: labor & delivery  Patient participation: Yes- Able to Participate  Level of consciousness: awake and alert  Pain management: adequate  Airway patency: patent      Anesthetic complications: no      Cardiovascular status: blood pressure returned to baseline  Respiratory status: unassisted  Follow-up not needed.          Vitals Value Taken Time   /81 07/26/23 0745   Temp 36.8 °C (98.3 °F) 07/26/23 1230   Pulse 78 07/26/23 0745   Resp 18 07/26/23 0745   SpO2 92 % 07/25/23 1846   Vitals shown include unvalidated device data.      No case tracking events are documented in the log.      Pain/Marco Score: Pain Rating Prior to Med Admin: 5 (7/26/2023 12:30 PM)

## 2023-07-26 NOTE — PLAN OF CARE
Problem:  Fall Injury Risk  Goal: Absence of Fall, Infant Drop and Related Injury  2023 by Tessie Kan RN  Outcome: Ongoing, Progressing  2023 by Tessie Kan RN  Outcome: Ongoing, Progressing     Problem: Infection  Goal: Absence of Infection Signs and Symptoms  2023 by Tessie Kan RN  Outcome: Ongoing, Progressing  2023 by Tessie Kan RN  Outcome: Ongoing, Progressing     Problem: Adult Inpatient Plan of Care  Goal: Plan of Care Review  2023 by Tessie Kan RN  Outcome: Ongoing, Progressing  2023 by Tessie Kan RN  Outcome: Ongoing, Progressing  Goal: Patient-Specific Goal (Individualized)  2023 by Tessie Kan RN  Outcome: Ongoing, Progressing  2023 by Tessie Kan RN  Outcome: Ongoing, Progressing  Goal: Absence of Hospital-Acquired Illness or Injury  2023 by Tessie Kan RN  Outcome: Ongoing, Progressing  2023 by Tessie Kan RN  Outcome: Ongoing, Progressing  Goal: Optimal Comfort and Wellbeing  2023 by Tessie Kan RN  Outcome: Ongoing, Progressing  2023 by Tessie Kan RN  Outcome: Ongoing, Progressing  Goal: Readiness for Transition of Care  2023 by Tessie Kan RN  Outcome: Ongoing, Progressing  2023 by Tessie Kan RN  Outcome: Ongoing, Progressing     Problem: Adjustment to Role Transition (Postpartum Vaginal Delivery)  Goal: Successful Maternal Role Transition  2023 by Tessie Kan RN  Outcome: Ongoing, Progressing  2023 by Tessie Kan RN  Outcome: Ongoing, Progressing     Problem: Bleeding (Postpartum Vaginal Delivery)  Goal: Hemostasis  2023 by Tessie Kan RN  Outcome: Ongoing, Progressing  2023 by Tessie Kan RN  Outcome: Ongoing, Progressing     Problem: Infection (Postpartum Vaginal Delivery)  Goal: Absence of Infection Signs/Symptoms  2023  by Tessie Kan RN  Outcome: Ongoing, Progressing  7/26/2023 0713 by Tessie Kan RN  Outcome: Ongoing, Progressing     Problem: Pain (Postpartum Vaginal Delivery)  Goal: Acceptable Pain Control  7/26/2023 0713 by Tessie Kan RN  Outcome: Ongoing, Progressing  7/26/2023 0713 by Tessie Kan RN  Outcome: Ongoing, Progressing     Problem: Urinary Retention (Postpartum Vaginal Delivery)  Goal: Effective Urinary Elimination  7/26/2023 0713 by Tessie Kan RN  Outcome: Ongoing, Progressing  7/26/2023 0713 by Tessie Kan RN  Outcome: Ongoing, Progressing

## 2023-07-26 NOTE — PLAN OF CARE
Problem:  Fall Injury Risk  Goal: Absence of Fall, Infant Drop and Related Injury  Outcome: Ongoing, Progressing     Problem: Infection  Goal: Absence of Infection Signs and Symptoms  Outcome: Ongoing, Progressing     Problem: Adult Inpatient Plan of Care  Goal: Plan of Care Review  Outcome: Ongoing, Progressing  Goal: Patient-Specific Goal (Individualized)  Outcome: Ongoing, Progressing  Goal: Absence of Hospital-Acquired Illness or Injury  Outcome: Ongoing, Progressing  Goal: Optimal Comfort and Wellbeing  Outcome: Ongoing, Progressing  Goal: Readiness for Transition of Care  Outcome: Ongoing, Progressing     Problem: Adjustment to Role Transition (Postpartum Vaginal Delivery)  Goal: Successful Maternal Role Transition  Outcome: Ongoing, Progressing     Problem: Bleeding (Postpartum Vaginal Delivery)  Goal: Hemostasis  Outcome: Ongoing, Progressing     Problem: Infection (Postpartum Vaginal Delivery)  Goal: Absence of Infection Signs/Symptoms  Outcome: Ongoing, Progressing     Problem: Pain (Postpartum Vaginal Delivery)  Goal: Acceptable Pain Control  Outcome: Ongoing, Progressing     Problem: Urinary Retention (Postpartum Vaginal Delivery)  Goal: Effective Urinary Elimination  Outcome: Ongoing, Progressing

## 2023-07-27 VITALS
RESPIRATION RATE: 18 BRPM | DIASTOLIC BLOOD PRESSURE: 78 MMHG | TEMPERATURE: 98 F | WEIGHT: 220 LBS | HEART RATE: 72 BPM | HEIGHT: 65 IN | SYSTOLIC BLOOD PRESSURE: 132 MMHG | OXYGEN SATURATION: 92 % | BODY MASS INDEX: 36.65 KG/M2

## 2023-07-27 PROBLEM — Z34.90 PREGNANCY: Status: ACTIVE | Noted: 2023-07-27

## 2023-07-27 PROCEDURE — 25000003 PHARM REV CODE 250: Performed by: OBSTETRICS & GYNECOLOGY

## 2023-07-27 RX ORDER — OXYCODONE AND ACETAMINOPHEN 5; 325 MG/1; MG/1
1 TABLET ORAL EVERY 6 HOURS PRN
Qty: 16 TABLET | Refills: 0 | Status: SHIPPED | OUTPATIENT
Start: 2023-07-27

## 2023-07-27 RX ADMIN — IBUPROFEN 600 MG: 600 TABLET ORAL at 07:07

## 2023-07-27 RX ADMIN — IBUPROFEN 600 MG: 600 TABLET ORAL at 02:07

## 2023-07-27 NOTE — PLAN OF CARE
"  Problem:  Fall Injury Risk  Goal: Absence of Fall, Infant Drop and Related Injury  Outcome: Ongoing, Progressing     Problem: Infection  Goal: Absence of Infection Signs and Symptoms  Outcome: Ongoing, Progressing     Problem: Adult Inpatient Plan of Care  Goal: Plan of Care Review  Outcome: Ongoing, Progressing  Goal: Patient-Specific Goal ("to go home tomorrow")  Outcome: Ongoing, Progressing  Goal: Absence of Hospital-Acquired Illness or Injury  Outcome: Ongoing, Progressing  Goal: Optimal Comfort and Wellbeing  Outcome: Ongoing, Progressing  Goal: Readiness for Transition of Care  Outcome: Ongoing, Progressing     Problem: Adjustment to Role Transition (Postpartum Vaginal Delivery)  Goal: Successful Maternal Role Transition  Outcome: Ongoing, Progressing     Problem: Bleeding (Postpartum Vaginal Delivery)  Goal: Hemostasis  Outcome: Ongoing, Progressing     Problem: Infection (Postpartum Vaginal Delivery)  Goal: Absence of Infection Signs/Symptoms  Outcome: Ongoing, Progressing     Problem: Pain (Postpartum Vaginal Delivery)  Goal: Acceptable Pain Control  Outcome: Ongoing, Progressing     Problem: Urinary Retention (Postpartum Vaginal Delivery)  Goal: Effective Urinary Elimination  Outcome: Ongoing, Progressing     "

## 2023-07-27 NOTE — PLAN OF CARE
Problem:  Fall Injury Risk  Goal: Absence of Fall, Infant Drop and Related Injury  Outcome: Met     Problem: Infection  Goal: Absence of Infection Signs and Symptoms  Outcome: Met     Problem: Adult Inpatient Plan of Care  Goal: Plan of Care Review  Outcome: Met  Goal: Patient-Specific Goal (Individualized)  Outcome: Met  Goal: Absence of Hospital-Acquired Illness or Injury  Outcome: Met  Goal: Optimal Comfort and Wellbeing  Outcome: Met  Goal: Readiness for Transition of Care  Outcome: Met     Problem: Adjustment to Role Transition (Postpartum Vaginal Delivery)  Goal: Successful Maternal Role Transition  Outcome: Met     Problem: Bleeding (Postpartum Vaginal Delivery)  Goal: Hemostasis  Outcome: Met     Problem: Infection (Postpartum Vaginal Delivery)  Goal: Absence of Infection Signs/Symptoms  Outcome: Met     Problem: Pain (Postpartum Vaginal Delivery)  Goal: Acceptable Pain Control  Outcome: Met     Problem: Urinary Retention (Postpartum Vaginal Delivery)  Goal: Effective Urinary Elimination  Outcome: Met

## 2023-07-27 NOTE — DISCHARGE SUMMARY
[facility] - 2nd Floor Mother/Baby Unit  Obstetrics  Discharge Summary      Patient Name: Arabella Toribio  MRN: [id number]  Admission Date: 7/25/2023  Hospital Length of Stay: 2 days  Discharge Date and Time:  07/27/2023 9:26 AM  Attending Physician: Aime Knight MD   Discharging Provider: Aime Knight MD   Primary Care Provider: Elicia King NP    HPI: No notes on file        * No surgery found *     Hospital Course:   No notes on file     Consults (From admission, onward)          Status Ordering Provider     Inpatient consult to Anesthesiology  Once        Provider:  Lucius Palma MD    Acknowledged AIME KNIGHT            Final Active Diagnoses:    Diagnosis Date Noted POA    PRINCIPAL PROBLEM:  Pregnancy [Z34.90] 07/27/2023 Not Applicable      Problems Resolved During this Admission:        Significant Diagnostic Studies: Labs: All labs within the past 24 hours have been reviewed      Feeding Method: both breast and bottle    Immunizations       Date Immunization Status Dose Route/Site Given by    07/25/23 1750 MMR Incomplete 0.5 mL Subcutaneous/     07/25/23 1750 Tdap Incomplete 0.5 mL Intramuscular/             Delivery:    Episiotomy: None   Lacerations: 1st   Repair suture: None   Repair # of packets: 1   Blood loss (ml):       Birth information:  YOB: 2024   Time of birth: 4:38 PM   Sex: male   Delivery type: Vaginal, Spontaneous   Gestational Age: 38w0d    Delivery Clinician:      Other providers:       Additional  information:  Forceps:    Vacuum:    Breech:    Observed anomalies      Living?:           APGARS  One minute Five minutes Ten minutes   Skin color:         Heart rate:         Grimace:         Muscle tone:         Breathing:         Totals: 8  9        Placenta: Delivered:       appearance  Pending Diagnostic Studies:       None            Discharged Condition: stable    Disposition: Home or Self Care    Follow Up:    Patient Instructions:   No  discharge procedures on file.  Medications:  Current Discharge Medication List        START taking these medications    Details   oxyCODONE-acetaminophen (PERCOCET) 5-325 mg per tablet Take 1 tablet by mouth every 6 (six) hours as needed for Pain.  Qty: 16 tablet, Refills: 0    Comments: Quantity prescribed more than 7 day supply? Yes, quantity medically necessary           CONTINUE these medications which have NOT CHANGED    Details   aspirin (ECOTRIN) 81 MG EC tablet Take 81 mg by mouth once daily.      boric acid (PH-D) 600 mg vaginal suppository Insert 1 supp. Vaginally every night at bedtime for 7 days and then insert 1 supp. Vaginally twice weekly as needed  Qty: 30 suppository, Refills: 6    Associated Diagnoses: BV (bacterial vaginosis)      metroNIDAZOLE (METROGEL) 0.75 % (37.5mg/5 gram) vaginal gel Place 1 applicator vaginally every evening.  Qty: 70 g, Refills: 0    Associated Diagnoses: BV (bacterial vaginosis)      prenatal 93-iron-folate 9-dha (TRISTART DHA) 31 mg iron- 1 mg-200 mg Cap Take 1 capsule daily  Qty: 30 capsule, Refills: 11    Associated Diagnoses: Encounter for supervision of high risk pregnancy in second trimester, antepartum; Encntr for obs for oth suspected diseases and cond ruled out      progesterone (PROMETRIUM) 200 MG capsule Take 200 mg by mouth every evening.      promethazine (PHENERGAN) 25 MG suppository Place 1 suppository (25 mg total) rectally every 6 (six) hours as needed for Nausea.  Qty: 12 suppository, Refills: 1    Associated Diagnoses: Encounter for supervision of high risk pregnancy in second trimester, antepartum      valACYclovir (VALTREX) 500 MG tablet Take 1 tablet (500 mg total) by mouth 2 (two) times daily. for 3 days  Qty: 6 tablet, Refills: 0    Associated Diagnoses: HSV infection             Lewis Knight MD  Obstetrics  [facility] - 2nd Floor Mother/Baby Unit

## 2023-07-27 NOTE — LACTATION NOTE
Baby sleepy because of bath. Assisted with latch and positioning on the left breast in football position. Latch sustained. Encouraged frequent feeds on cue, discussed early hunger cues. Encouraged waking baby if needed to ensure 8 or more feeds per 24 hrs. Tips on waking sleepy baby discussed. Signs of milk transfer/adequate intake discussed. Encouraged to call with any signs indicating a problem, such as painful latch, nipple irritation, unable to sustain latch, or with any questions or needs.

## 2023-10-17 ENCOUNTER — LAB VISIT (OUTPATIENT)
Dept: LAB | Facility: HOSPITAL | Age: 28
End: 2023-10-17
Attending: OBSTETRICS & GYNECOLOGY
Payer: MEDICAID

## 2023-10-17 DIAGNOSIS — N91.2 AMENORRHEA: ICD-10-CM

## 2023-10-17 LAB — B-HCG FREE SERPL-ACNC: 90.71 MIU/ML

## 2023-10-17 PROCEDURE — 84702 CHORIONIC GONADOTROPIN TEST: CPT

## 2023-10-17 PROCEDURE — 36415 COLL VENOUS BLD VENIPUNCTURE: CPT

## 2024-02-05 DIAGNOSIS — O09.212 SUPERVISION OF PREGNANCY WITH HISTORY OF PRE-TERM LABOR IN SECOND TRIMESTER: Primary | ICD-10-CM

## 2024-02-08 ENCOUNTER — OFFICE VISIT (OUTPATIENT)
Dept: MATERNAL FETAL MEDICINE | Facility: CLINIC | Age: 29
End: 2024-02-08
Payer: MEDICAID

## 2024-02-08 ENCOUNTER — TELEPHONE (OUTPATIENT)
Dept: MATERNAL FETAL MEDICINE | Facility: CLINIC | Age: 29
End: 2024-02-08

## 2024-02-08 ENCOUNTER — PROCEDURE VISIT (OUTPATIENT)
Dept: MATERNAL FETAL MEDICINE | Facility: CLINIC | Age: 29
End: 2024-02-08
Payer: MEDICAID

## 2024-02-08 VITALS
DIASTOLIC BLOOD PRESSURE: 81 MMHG | SYSTOLIC BLOOD PRESSURE: 121 MMHG | WEIGHT: 210 LBS | BODY MASS INDEX: 34.99 KG/M2 | HEART RATE: 96 BPM | HEIGHT: 65 IN | RESPIRATION RATE: 18 BRPM

## 2024-02-08 DIAGNOSIS — O09.212 SUPERVISION OF PREGNANCY WITH HISTORY OF PRE-TERM LABOR IN SECOND TRIMESTER: ICD-10-CM

## 2024-02-08 DIAGNOSIS — Z36.89 ENCOUNTER FOR FETAL ANATOMIC SURVEY: ICD-10-CM

## 2024-02-08 DIAGNOSIS — O09.90 AT HIGH RISK FOR COMPLICATIONS OF INTRAUTERINE PREGNANCY (IUP): ICD-10-CM

## 2024-02-08 DIAGNOSIS — O09.899 SHORT INTERVAL BETWEEN PREGNANCIES AFFECTING PREGNANCY, ANTEPARTUM: ICD-10-CM

## 2024-02-08 DIAGNOSIS — Z03.89 ENCNTR FOR OBS FOR OTH SUSPECTED DISEASES AND COND RULED OUT: ICD-10-CM

## 2024-02-08 DIAGNOSIS — O09.292 HISTORY OF PRETERM PREMATURE RUPTURE OF MEMBRANES (PROM) IN PREVIOUS PREGNANCY, CURRENTLY PREGNANT IN SECOND TRIMESTER: Primary | ICD-10-CM

## 2024-02-08 DIAGNOSIS — O09.91 ENCOUNTER FOR SUPERVISION OF HIGH RISK PREGNANCY IN FIRST TRIMESTER, ANTEPARTUM: ICD-10-CM

## 2024-02-08 DIAGNOSIS — Z87.51 HISTORY OF PRETERM LABOR: ICD-10-CM

## 2024-02-08 DIAGNOSIS — E66.01 MORBID OBESITY: ICD-10-CM

## 2024-02-08 DIAGNOSIS — O99.212 OBESITY AFFECTING PREGNANCY IN SECOND TRIMESTER, UNSPECIFIED OBESITY TYPE: ICD-10-CM

## 2024-02-08 PROCEDURE — 3008F BODY MASS INDEX DOCD: CPT | Mod: CPTII,S$GLB,, | Performed by: OBSTETRICS & GYNECOLOGY

## 2024-02-08 PROCEDURE — 76817 TRANSVAGINAL US OBSTETRIC: CPT | Mod: S$GLB,,, | Performed by: OBSTETRICS & GYNECOLOGY

## 2024-02-08 PROCEDURE — 3074F SYST BP LT 130 MM HG: CPT | Mod: CPTII,S$GLB,, | Performed by: OBSTETRICS & GYNECOLOGY

## 2024-02-08 PROCEDURE — 76811 OB US DETAILED SNGL FETUS: CPT | Mod: S$GLB,,, | Performed by: OBSTETRICS & GYNECOLOGY

## 2024-02-08 PROCEDURE — 1159F MED LIST DOCD IN RCRD: CPT | Mod: CPTII,S$GLB,, | Performed by: OBSTETRICS & GYNECOLOGY

## 2024-02-08 PROCEDURE — 3079F DIAST BP 80-89 MM HG: CPT | Mod: CPTII,S$GLB,, | Performed by: OBSTETRICS & GYNECOLOGY

## 2024-02-08 PROCEDURE — 99203 OFFICE O/P NEW LOW 30 MIN: CPT | Mod: 25,TH,S$GLB, | Performed by: OBSTETRICS & GYNECOLOGY

## 2024-02-08 NOTE — PROGRESS NOTES
Maternal Fetal Medicine Consult    Subjective     Patient ID: [id number]    Chief Complaint: MFM Consult (The patient voiced no new problem or concerns at this time. //)      HPI: 28 y.o.  female  at 19w3d gestation with Estimated Date of Delivery: 24 by LMP, consistent with early US. She is sent for MFM consultation for history of  delivery.     She has history of PPROM at 26 weeks in her first pregnancy and delivered at 29 weeks.  She was on weekly Marcella in her last pregnancy that was switched to vaginal progesterone after FDA withdrawal of Marcella.  She delivered at 38 weeks' gestation.  This is a short interval pregnancy with her last delivery on 2023.  She has increased BMI of 35.0 at initial MFM consultation visit.       She denies any personal or family history of aneuploidy or anomalies. She denies any exposure to high fevers, viral rashes, illicit drugs or alcohol in this pregnancy.  She denies any leaking fluid, vaginal bleeding, contractions, decreased fetal movement. Denies headaches, visual disturbances, or epigastric pain.       Pregnancy complications include:  Patient Active Problem List   Diagnosis    Pregnancy    History of  premature rupture of membranes (PROM) in previous pregnancy, currently pregnant in second trimester    Short interval between pregnancies affecting pregnancy, antepartum    BMI 35 at initial MFM consultation    At high risk for complications of intrauterine pregnancy (IUP)    Increased BMI affecting pregnancy in second trimester        History reviewed. No pertinent past medical history.    Past Surgical History:   Procedure Laterality Date    BREAST CYST INCISION AND DRAINAGE         Family History   Problem Relation Age of Onset    Lung cancer Maternal Grandmother     Lung cancer Maternal Grandfather     Lung cancer Other     Breast cancer Neg Hx         negative family history of breast cancer       Social History     Socioeconomic History     Marital status: Single   Tobacco Use    Smoking status: Never    Smokeless tobacco: Never   Substance and Sexual Activity    Alcohol use: Not Currently    Drug use: Never    Sexual activity: Yes       Current Outpatient Medications   Medication Sig Dispense Refill    PNV,calcium 72-iron,carb-folic (PRENATAL PLUS) 29 mg iron- 1 mg Tab Take 1 tablet by mouth once daily. 30 tablet 11    aspirin (ECOTRIN) 81 MG EC tablet Take 81 mg by mouth once daily.      boric acid (PH-D) 600 mg vaginal suppository Insert 1 supp. Vaginally every night at bedtime for 7 days and then insert 1 supp. Vaginally twice weekly as needed (Patient not taking: Reported on 5/26/2023) 30 suppository 6    metroNIDAZOLE (METROGEL) 0.75 % (37.5mg/5 gram) vaginal gel Place 1 applicator vaginally every evening. (Patient not taking: Reported on 5/26/2023) 70 g 0    oxyCODONE-acetaminophen (PERCOCET) 5-325 mg per tablet Take 1 tablet by mouth every 6 (six) hours as needed for Pain. (Patient not taking: Reported on 2/8/2024) 16 tablet 0    prenatal 93-iron-folate 9-dha (TRISTART DHA) 31 mg iron- 1 mg-200 mg Cap Take 1 capsule daily (Patient not taking: Reported on 2/8/2024) 30 capsule 11    progesterone (PROMETRIUM) 200 MG capsule Take 200 mg by mouth every evening.      valACYclovir (VALTREX) 500 MG tablet Take 1 tablet (500 mg total) by mouth 2 (two) times daily. for 3 days (Patient not taking: Reported on 2/8/2024) 6 tablet 0     No current facility-administered medications for this visit.       Review of patient's allergies indicates:  No Known Allergies    Medications:  Current Outpatient Medications   Medication Instructions    aspirin (ECOTRIN) 81 mg, Oral, Daily    boric acid (PH-D) 600 mg vaginal suppository Insert 1 supp. Vaginally every night at bedtime for 7 days and then insert 1 supp. Vaginally twice weekly as needed    metroNIDAZOLE (METROGEL) 0.75 % (37.5mg/5 gram) vaginal gel 1 applicator, Vaginal, Nightly    oxyCODONE-acetaminophen  "(PERCOCET) 5-325 mg per tablet 1 tablet, Oral, Every 6 hours PRN    PNV,calcium 72-iron,carb-folic (PRENATAL PLUS) 29 mg iron- 1 mg Tab 1 tablet, Oral, Daily    prenatal 93-iron-folate 9-dha (TRISTART DHA) 31 mg iron- 1 mg-200 mg Cap Take 1 capsule daily    progesterone (PROMETRIUM) 200 mg, Oral, Nightly    valACYclovir (VALTREX) 500 mg, Oral, 2 times daily       Review of Systems   12 point review of systems conducted, negative except as stated in the history of present illness. See HPI for details.      Objective     Visit Vitals  /81 (BP Location: Left arm, Patient Position: Sitting, BP Method: Medium (Automatic))   Pulse 96   Resp 18   Ht 5' 5" (1.651 m)   Wt 95.3 kg (210 lb)   LMP 09/25/2023   BMI 34.95 kg/m²        Physical Exam  Vitals and nursing note reviewed.   Constitutional:       General: She is not in acute distress.     Appearance: Normal appearance.      Comments: Increased BMI   HENT:      Head: Normocephalic and atraumatic.      Nose: Nose normal. No congestion.      Mouth/Throat:      Pharynx: Oropharynx is clear.   Eyes:      General: No scleral icterus.     Conjunctiva/sclera: Conjunctivae normal.   Cardiovascular:      Rate and Rhythm: Normal rate and regular rhythm.   Pulmonary:      Effort: No respiratory distress.      Breath sounds: Normal breath sounds. No wheezing.   Abdominal:      General: Abdomen is flat.      Palpations: Abdomen is soft.      Tenderness: There is no abdominal tenderness. There is no right CVA tenderness, left CVA tenderness or guarding.      Comments: No CVA tenderness gravid uterus.    Musculoskeletal:         General: Normal range of motion.      Cervical back: Neck supple.      Right lower leg: No edema.      Left lower leg: No edema.   Skin:     General: Skin is warm.      Findings: No bruising or rash.   Neurological:      General: No focal deficit present.      Mental Status: She is oriented to person, place, and time.      Deep Tendon Reflexes: Reflexes " normal.      Comments: Normal reflexes   Psychiatric:         Mood and Affect: Mood normal.         Behavior: Behavior normal.         Thought Content: Thought content normal.         Judgment: Judgment normal.         ASSESSMENT/PLAN:     28 y.o.  female with IUP at 19w3d    History of PPROM at 26 weeks in previous pregnancy  There is normal fetal growth and no anomalies seen on fetal anatomy scan.  AFV is normal.     I discussed with her the increased risk of recurrence of another  delivery with risk around 1/3. The risk ranges from 15% with one previous  delivery after 32 weeks that was followed by a term at birth to 60% with history of 2 consecutive  deliveries before 32 weeks. I have shared with her that the  delivery could occur at an earlier gestational age with more significant morbidity and high risk of  mortality associated with that.    Discussed withdrawal of 17P by FDA and new guidelines for management of previous  delivery. Recommend intermittent transvaginal ultrasounds starting at 16 weeks until 23 weeks. If cervical length is less than 3 cm, weekly TVUS is recommended and may consider vaginal progesterone nightly. If cervix is less than 25 mm, consider cerclage, especially if previous  delivery less 28 weeks, than along with continuing vaginal progesterone nightly. Conversely, may consider nightly vaginal progesterone nightly starting at 16 weeks until 37 weeks regardless of cervical length, although no data of benefit if cervix longer than 3 cm. Discussed risks/benefits of all options, understanding that neither option is fully protective against pregnancy loss. She would like to avoid vaginal progesterone and do cervical surveillance at this time.    TVUS today 2024  with normal closed cervix 4.2 cm without funneling at the IO. No evidence of cervical insufficiency at this time. Will recheck in about 2 weeks. PTL precautions  given.    Will plan to do TVUS intermittently until 24 weeks. If cervical length less than 3 cm, will consider vaginal progesterone at that time. If cervical length less than 2.5 cm, consider cerclage along with vaginal progesterone. PTL precautions given.      Short interval pregnancy  A short interpregnancy interval is defined as an interval between the delivery of one pregnancy and the conception of a subsequent pregnancy. Most adverse pregnancy outcomes are associated with an IPI of < 6 months. A short IPI is associated with increased risk of maternal anemia, PPROM/PTB, delivery of a SGA infant, and placental abruption. Additionally, the risk of recurrent preeclampsia is higher with an IPI of < 12 months. Maternal anemia due to iron deficiency should be corrected. Additionally, the risk of uterine rupture is higher in women with a short interdelivery interval (< 18 months- birth to birth interval). Additionally,risk of TOLAC failure is higher and therefore, women should be counseled accordingly. FKC and  labor instructions given.       Increased BMI in pregnancy BMI 35 at initial consult visit  Body mass index is 35.0 kg/m².    I discussed the risk of miscarriage in first trimester, recurrent miscarriages, congenital anomalies, hypertension, diabetes,  labor and the higher risk of  section and the higher risk of fetal demise in-utero. There is also higher risk of for excessive fetal weight and large for gestational age (LGA) fetuses. Mothers with LGA fetuses are at higher risk of prolonged labor,  delivery, shoulder dystocia and birth trauma. LGA neonates are increased risk of fetal hypoxia and intrauterine death, and are at risk to develop diabetes, obesity, metabolic syndrome, asthma and cancer later in life. She was advised of the importance of eating healthy and limiting weight gain to 11-20 lbs during the pregnancy, as optimal in this situation. I recommended low calorie, low  fat diet avoiding any additional excessive weight gain. Excess weight gain would be associated with gestational hypertension, gestational diabetes and adverse  outcomes, including fetal demise in utero.    It is important to do FKC from 24 weeks till delivery.       Importance of working on losing weight after the pregnancy is over, especially before a future pregnancy was discussed. Breastfeeding may be an important tool in reducing the postpartum weight retention. Fetal risks were discussed with short term risk of fetal/ obesity and long term risk of adolescent component of metabolic syndrome.    Follow a healthy low caloric diet..       At high risk for preeclampsia  With her risk factors for preeclampsia including BMI over 30,  ancestry, and low socioeconomic status, discussed recommendations for low dose aspirin use to decrease risks for adverse outcomes, including preeclampsia, low birth rate and  delivery. Low-dose aspirin reduced the risk for preeclampsia by 15% in clinical trials and reduced the risk for  birth by 20% and FGR by 20%, and  mortality by around 20%.  After discussing risks/benefits of its use, it was agreed to start asa 81 mg daily today and continue until delivery.. Also, recommend using in all future pregnancies.  Lowercase there is no indication for cerclage nor vaginal progesterone.  We will plan to rechecked the cervix again in 2 weeks.  Start patient on daily aspirin.  Importance of healthy diet and limiting weight gain to decrease the risk of complications in pregnancy discussed.    Follow up in about 2 weeks (around 2024) for [facility] follow-up, Transvaginal ultrasound.     Future Appointments   Date Time Provider Department Center   2024  1:45 PM [facility]   2024 10:00 AM Kulwinder Marte MD [facility]   2024 10:00 AM ROOM 3, SERVANDO KIANA [facility] MIRIAM        Patient was  evaluated by PEYMAN Cruz and Dr. Marte.  Final assessment and recommendations as stated above were made by Dr. Marte.    This note was created with the assistance of [employer] voice recognition software. There may be transcription errors as a result of using this technology, however minimal. Effort has been made to ensure accuracy of transcription, but any obvious errors or omissions should be clarified with the author of the document.

## 2024-02-19 ENCOUNTER — LAB VISIT (OUTPATIENT)
Dept: LAB | Facility: HOSPITAL | Age: 29
End: 2024-02-19
Attending: OBSTETRICS & GYNECOLOGY
Payer: MEDICAID

## 2024-02-19 DIAGNOSIS — O09.91 ENCOUNTER FOR SUPERVISION OF HIGH RISK PREGNANCY IN FIRST TRIMESTER, ANTEPARTUM: ICD-10-CM

## 2024-02-19 DIAGNOSIS — Z03.89 ENCNTR FOR OBS FOR OTH SUSPECTED DISEASES AND COND RULED OUT: ICD-10-CM

## 2024-02-19 DIAGNOSIS — E66.01 MORBID OBESITY: ICD-10-CM

## 2024-02-19 DIAGNOSIS — Z87.51 HISTORY OF PRETERM LABOR: Primary | ICD-10-CM

## 2024-02-19 LAB
BASOPHILS # BLD AUTO: 0.01 X10(3)/MCL
BASOPHILS NFR BLD AUTO: 0.1 %
EOSINOPHIL # BLD AUTO: 0.12 X10(3)/MCL (ref 0–0.9)
EOSINOPHIL NFR BLD AUTO: 1 %
ERYTHROCYTE [DISTWIDTH] IN BLOOD BY AUTOMATED COUNT: 13.3 % (ref 11.5–17)
GROUP & RH: NORMAL
HBV SURFACE AG SERPL QL IA: NONREACTIVE
HCT VFR BLD AUTO: 39.2 % (ref 37–47)
HCV AB SERPL QL IA: NONREACTIVE
HGB BLD-MCNC: 12.8 G/DL (ref 12–16)
HIV 1+2 AB+HIV1 P24 AG SERPL QL IA: NONREACTIVE
IMM GRANULOCYTES # BLD AUTO: 0.02 X10(3)/MCL (ref 0–0.04)
IMM GRANULOCYTES NFR BLD AUTO: 0.2 %
INDIRECT COOMBS: NORMAL
LYMPHOCYTES # BLD AUTO: 1.66 X10(3)/MCL (ref 0.6–4.6)
LYMPHOCYTES NFR BLD AUTO: 14.5 %
MCH RBC QN AUTO: 32.4 PG (ref 27–31)
MCHC RBC AUTO-ENTMCNC: 32.7 G/DL (ref 33–36)
MCV RBC AUTO: 99.2 FL (ref 80–94)
MONOCYTES # BLD AUTO: 0.59 X10(3)/MCL (ref 0.1–1.3)
MONOCYTES NFR BLD AUTO: 5.1 %
NEUTROPHILS # BLD AUTO: 9.07 X10(3)/MCL (ref 2.1–9.2)
NEUTROPHILS NFR BLD AUTO: 79.1 %
PLATELET # BLD AUTO: 256 X10(3)/MCL (ref 130–400)
PMV BLD AUTO: 10.6 FL (ref 7.4–10.4)
RBC # BLD AUTO: 3.95 X10(6)/MCL (ref 4.2–5.4)
SPECIMEN OUTDATE: NORMAL
T4 FREE SERPL-MCNC: 0.78 NG/DL (ref 0.7–1.48)
TSH SERPL-ACNC: 1.33 UIU/ML (ref 0.35–4.94)
WBC # SPEC AUTO: 11.47 X10(3)/MCL (ref 4.5–11.5)

## 2024-02-19 PROCEDURE — 85025 COMPLETE CBC W/AUTO DIFF WBC: CPT

## 2024-02-19 PROCEDURE — 87340 HEPATITIS B SURFACE AG IA: CPT

## 2024-02-19 PROCEDURE — 84439 ASSAY OF FREE THYROXINE: CPT

## 2024-02-19 PROCEDURE — 86850 RBC ANTIBODY SCREEN: CPT | Performed by: OBSTETRICS & GYNECOLOGY

## 2024-02-19 PROCEDURE — 87389 HIV-1 AG W/HIV-1&-2 AB AG IA: CPT

## 2024-02-19 PROCEDURE — 36415 COLL VENOUS BLD VENIPUNCTURE: CPT

## 2024-02-19 PROCEDURE — 86780 TREPONEMA PALLIDUM: CPT

## 2024-02-19 PROCEDURE — 86762 RUBELLA ANTIBODY: CPT

## 2024-02-19 PROCEDURE — 86803 HEPATITIS C AB TEST: CPT

## 2024-02-19 PROCEDURE — 84443 ASSAY THYROID STIM HORMONE: CPT

## 2024-02-19 PROCEDURE — 86787 VARICELLA-ZOSTER ANTIBODY: CPT

## 2024-02-20 LAB
RUBV IGG SERPL IA-ACNC: 0.6
RUBV IGG SERPL QL IA: NEGATIVE
T PALLIDUM AB SER QL: NONREACTIVE
VZV IGG SER IA-ACNC: 2.1
VZV IGG SER QL IA: POSITIVE

## 2024-02-28 ENCOUNTER — PROCEDURE VISIT (OUTPATIENT)
Dept: MATERNAL FETAL MEDICINE | Facility: CLINIC | Age: 29
End: 2024-02-28
Payer: MEDICAID

## 2024-02-28 ENCOUNTER — OFFICE VISIT (OUTPATIENT)
Dept: MATERNAL FETAL MEDICINE | Facility: CLINIC | Age: 29
End: 2024-02-28
Payer: MEDICAID

## 2024-02-28 VITALS
DIASTOLIC BLOOD PRESSURE: 69 MMHG | WEIGHT: 218 LBS | HEART RATE: 98 BPM | SYSTOLIC BLOOD PRESSURE: 121 MMHG | BODY MASS INDEX: 36.32 KG/M2 | HEIGHT: 65 IN

## 2024-02-28 DIAGNOSIS — O99.212 OBESITY AFFECTING PREGNANCY IN SECOND TRIMESTER, UNSPECIFIED OBESITY TYPE: ICD-10-CM

## 2024-02-28 DIAGNOSIS — E66.01 MORBID OBESITY: ICD-10-CM

## 2024-02-28 DIAGNOSIS — O09.899 SHORT INTERVAL BETWEEN PREGNANCIES AFFECTING PREGNANCY, ANTEPARTUM: ICD-10-CM

## 2024-02-28 DIAGNOSIS — O26.02 EXCESSIVE WEIGHT GAIN DURING PREGNANCY IN SECOND TRIMESTER: ICD-10-CM

## 2024-02-28 DIAGNOSIS — Z87.51 HISTORY OF PRETERM LABOR: ICD-10-CM

## 2024-02-28 DIAGNOSIS — O09.90 AT HIGH RISK FOR COMPLICATIONS OF INTRAUTERINE PREGNANCY (IUP): Primary | ICD-10-CM

## 2024-02-28 DIAGNOSIS — O09.292 HISTORY OF PRETERM PREMATURE RUPTURE OF MEMBRANES (PROM) IN PREVIOUS PREGNANCY, CURRENTLY PREGNANT IN SECOND TRIMESTER: ICD-10-CM

## 2024-02-28 PROBLEM — Z34.90 PREGNANCY: Status: RESOLVED | Noted: 2023-07-27 | Resolved: 2024-02-28

## 2024-02-28 PROCEDURE — 3074F SYST BP LT 130 MM HG: CPT | Mod: CPTII,S$GLB,, | Performed by: OBSTETRICS & GYNECOLOGY

## 2024-02-28 PROCEDURE — 1159F MED LIST DOCD IN RCRD: CPT | Mod: CPTII,S$GLB,, | Performed by: OBSTETRICS & GYNECOLOGY

## 2024-02-28 PROCEDURE — 99213 OFFICE O/P EST LOW 20 MIN: CPT | Mod: 25,TH,S$GLB, | Performed by: OBSTETRICS & GYNECOLOGY

## 2024-02-28 PROCEDURE — 3078F DIAST BP <80 MM HG: CPT | Mod: CPTII,S$GLB,, | Performed by: OBSTETRICS & GYNECOLOGY

## 2024-02-28 PROCEDURE — 3008F BODY MASS INDEX DOCD: CPT | Mod: CPTII,S$GLB,, | Performed by: OBSTETRICS & GYNECOLOGY

## 2024-02-28 PROCEDURE — 76816 OB US FOLLOW-UP PER FETUS: CPT | Mod: S$GLB,,, | Performed by: OBSTETRICS & GYNECOLOGY

## 2024-02-28 PROCEDURE — 1160F RVW MEDS BY RX/DR IN RCRD: CPT | Mod: CPTII,S$GLB,, | Performed by: OBSTETRICS & GYNECOLOGY

## 2024-02-28 PROCEDURE — 76817 TRANSVAGINAL US OBSTETRIC: CPT | Mod: S$GLB,,, | Performed by: OBSTETRICS & GYNECOLOGY

## 2024-02-28 NOTE — PROGRESS NOTES
Maternal Fetal Medicine Follow Up    Subjective     Patient ID: [id number]    Chief Complaint: M follow up with US      HPI: Arabella Toribio is a 28 y.o. female  at 22w2d gestation with Estimated Date of Delivery: 24  who is here for follow-up consultation by MFM.    She has history of PPROM at 26 weeks in her first pregnancy and delivered at 29 weeks.  She was on weekly Marcella in her last pregnancy that was switched to vaginal progesterone after FDA withdrawal of Marcella.  She delivered at 38 weeks' gestation.  This is a short interval pregnancy with her last delivery on 2023.  She has increased BMI of 35.0 at initial MFM consultation visit.  She is on low-dose aspirin daily.       Interval history since last MFM visit: None.. She denies any leaking fluid, vaginal bleeding, contractions, decreased fetal movement. Denies headaches, visual disturbances, or epigastric pain.    Pregnancy complications include:   Patient Active Problem List   Diagnosis    History of  premature rupture of membranes (PROM) in previous pregnancy, currently pregnant in second trimester    Short interval between pregnancies affecting pregnancy, antepartum    BMI 35 at initial MFM consultation    At high risk for complications of intrauterine pregnancy (IUP)    Increased BMI affecting pregnancy in second trimester    Excessive weight gain during pregnancy in second trimester        No changes to medical, surgical, family, social, or obstetric history.    Medications:  Current Outpatient Medications   Medication Instructions    aspirin (ECOTRIN) 81 mg, Oral, Daily    boric acid (PH-D) 600 mg vaginal suppository Insert 1 supp. Vaginally every night at bedtime for 7 days and then insert 1 supp. Vaginally twice weekly as needed    metroNIDAZOLE (METROGEL) 0.75 % (37.5mg/5 gram) vaginal gel 1 applicator, Vaginal, Nightly    oxyCODONE-acetaminophen (PERCOCET) 5-325 mg per tablet 1 tablet, Oral, Every 6 hours PRN     "PNV,calcium 72-iron,carb-folic (PRENATAL PLUS) 29 mg iron- 1 mg Tab 1 tablet, Oral, Daily    prenatal 93-iron-folate 9-dha (TRISTART DHA) 31 mg iron- 1 mg-200 mg Cap Take 1 capsule daily    progesterone (PROMETRIUM) 200 mg, Oral, Nightly    valACYclovir (VALTREX) 500 mg, Oral, 2 times daily       Review of Systems   12 point review of systems conducted, negative except as stated in the history of present illness. See HPI for details.      Objective     Visit Vitals  /69 (BP Location: Right arm, Patient Position: Sitting, BP Method: Large (Automatic))   Pulse 98   Ht 5' 5" (1.651 m)   Wt 98.9 kg (218 lb)   LMP 2023   BMI 36.28 kg/m²        Physical Exam  Vitals and nursing note reviewed.   Constitutional:       Appearance: Normal appearance.      Comments: Increased BMI   HENT:      Head: Normocephalic and atraumatic.      Nose: Nose normal. No congestion.   Cardiovascular:      Rate and Rhythm: Normal rate.   Pulmonary:      Effort: Pulmonary effort is normal.   Skin:     Findings: No rash.   Neurological:      Mental Status: She is alert and oriented to person, place, and time.   Psychiatric:         Mood and Affect: Mood normal.         Behavior: Behavior normal.         Thought Content: Thought content normal.         Judgment: Judgment normal.         ASSESSMENT/PLAN:     28 y.o.  female with IUP at 22w2d    History of PPROM at 26 weeks in previous pregnancy  There is normal fetal growth with fetal anatomy scan completed today.    Previously discussed withdrawal of 17P by FDA and new guidelines for management of previous  delivery. She is currently in cervical length surveillance.     TVUS today 2024  with normal closed cervix 3.8 cm without funneling at the IO. Cervical incompetence has been ruled out with no need for further vaginal ultrasounds at this time. PTL precautions given.         Short interval pregnancy  A short interpregnancy interval is defined as an interval between " the delivery of one pregnancy and the conception of a subsequent pregnancy. Most adverse pregnancy outcomes are associated with an IPI of < 6 months. A short IPI is associated with increased risk of maternal anemia, PPROM/PTB, delivery of a SGA infant, and placental abruption. Additionally, the risk of recurrent preeclampsia is higher with an IPI of < 12 months. Maternal anemia due to iron deficiency should be corrected. Additionally, the risk of uterine rupture is higher in women with a short interdelivery interval (< 18 months- birth to birth interval). Additionally,risk of TOLAC failure is higher and therefore, women should be counseled accordingly. FKC and  labor instructions given.       BMI 35 at initial consult visit  Body mass index is 36.28 kg/m². With excessive gain of 6 lb in 1 month, she was advised to decrease caloric intake and was reminded of the importance of avoiding excessive weight gain.  Excess weight gain would be associated with gestational hypertension, gestational diabetes and adverse  outcomes, including fetal demise in utero.    It is important to lose weight after the pregnancy is over, especially before a future pregnancy was discussed. Breastfeeding may be an important tool in reducing the postpartum weight retention. Fetal risks were discussed with short term risk of fetal/ obesity and long term risk of adolescent component of metabolic syndrome.    She was advised to do fetal kick counts 3 times daily after 24 weeks, with immediate reporting of decreased fetal movements (<10 movements/hr).       At high risk for preeclampsia  With her increased risk for preeclampsia, she agreed to continue asa 81 mg daily until delivery.. Preeclampsia precautions reviewed.     Fetal anatomy scan was completed today.  There is normal growth.  Cervical length has ruled out cervical incompetence.  Expectant management.   labor instructions.  There is no indication for  cerclage nor vaginal progesterone in this setting at this time.    Follow up in about 2 weeks (around 3/13/2024) for MFM follow-up, Repeat ultrasound, Room 1 or 2, to complete anatomy scan.     Future Appointments   Date Time Provider Department Center   3/18/2024  1:45 PM Lewis Knight MD [facility]        JAYSON involvement: Patient was evaluated and examined by Dr. Marte. PEYMAN Cruz, helped as  scribe in completion of part of note.    This note was created with the assistance of [employer] voice recognition software. There may be transcription errors as a result of using this technology, however minimal. Effort has been made to ensure accuracy of transcription, but any obvious errors or omissions should be clarified with the author of the document.

## 2024-06-03 ENCOUNTER — LAB VISIT (OUTPATIENT)
Dept: LAB | Facility: HOSPITAL | Age: 29
End: 2024-06-03
Attending: OBSTETRICS & GYNECOLOGY
Payer: MEDICAID

## 2024-06-03 DIAGNOSIS — O09.93 ENCOUNTER FOR SUPERVISION OF HIGH RISK PREGNANCY IN THIRD TRIMESTER, ANTEPARTUM: ICD-10-CM

## 2024-06-03 DIAGNOSIS — N91.2 AMENORRHEA: ICD-10-CM

## 2024-06-03 LAB
B-HCG FREE SERPL-ACNC: ABNORMAL MIU/ML
BASOPHILS # BLD AUTO: 0.02 X10(3)/MCL
BASOPHILS NFR BLD AUTO: 0.2 %
EOSINOPHIL # BLD AUTO: 0.09 X10(3)/MCL (ref 0–0.9)
EOSINOPHIL NFR BLD AUTO: 0.9 %
ERYTHROCYTE [DISTWIDTH] IN BLOOD BY AUTOMATED COUNT: 13.7 % (ref 11.5–17)
HCT VFR BLD AUTO: 36.1 % (ref 37–47)
HGB BLD-MCNC: 11.7 G/DL (ref 12–16)
HIV 1+2 AB+HIV1 P24 AG SERPL QL IA: NONREACTIVE
IMM GRANULOCYTES # BLD AUTO: 0.03 X10(3)/MCL (ref 0–0.04)
IMM GRANULOCYTES NFR BLD AUTO: 0.3 %
LYMPHOCYTES # BLD AUTO: 1.7 X10(3)/MCL (ref 0.6–4.6)
LYMPHOCYTES NFR BLD AUTO: 17.3 %
MCH RBC QN AUTO: 31.7 PG (ref 27–31)
MCHC RBC AUTO-ENTMCNC: 32.4 G/DL (ref 33–36)
MCV RBC AUTO: 97.8 FL (ref 80–94)
MONOCYTES # BLD AUTO: 0.8 X10(3)/MCL (ref 0.1–1.3)
MONOCYTES NFR BLD AUTO: 8.2 %
NEUTROPHILS # BLD AUTO: 7.17 X10(3)/MCL (ref 2.1–9.2)
NEUTROPHILS NFR BLD AUTO: 73.1 %
PLATELET # BLD AUTO: 166 X10(3)/MCL (ref 130–400)
PMV BLD AUTO: 12.2 FL (ref 7.4–10.4)
RBC # BLD AUTO: 3.69 X10(6)/MCL (ref 4.2–5.4)
T PALLIDUM AB SER QL: NONREACTIVE
WBC # SPEC AUTO: 9.81 X10(3)/MCL (ref 4.5–11.5)

## 2024-06-03 PROCEDURE — 85025 COMPLETE CBC W/AUTO DIFF WBC: CPT

## 2024-06-03 PROCEDURE — 84702 CHORIONIC GONADOTROPIN TEST: CPT

## 2024-06-03 PROCEDURE — 36415 COLL VENOUS BLD VENIPUNCTURE: CPT

## 2024-06-03 PROCEDURE — 86780 TREPONEMA PALLIDUM: CPT

## 2024-06-03 PROCEDURE — 87389 HIV-1 AG W/HIV-1&-2 AB AG IA: CPT

## 2024-06-06 LAB — PRENATAL STREP B CULTURE: NEGATIVE

## 2024-06-12 ENCOUNTER — HOSPITAL ENCOUNTER (EMERGENCY)
Facility: HOSPITAL | Age: 29
Discharge: HOME | End: 2024-06-12
Attending: OBSTETRICS & GYNECOLOGY
Payer: MEDICAID

## 2024-06-12 VITALS
HEART RATE: 93 BPM | RESPIRATION RATE: 18 BRPM | SYSTOLIC BLOOD PRESSURE: 127 MMHG | OXYGEN SATURATION: 98 % | DIASTOLIC BLOOD PRESSURE: 83 MMHG | TEMPERATURE: 98 F

## 2024-06-12 LAB
CTP QC/QA: YES
RUPTURE OF MEMBRANE: NEGATIVE

## 2024-06-12 PROCEDURE — 84112 EVAL AMNIOTIC FLUID PROTEIN: CPT

## 2024-06-12 PROCEDURE — 99284 EMERGENCY DEPT VISIT MOD MDM: CPT

## 2024-06-12 RX ORDER — FERROUS SULFATE TAB 325 MG (65 MG ELEMENTAL FE) 325 (65 FE) MG
TAB ORAL
COMMUNITY
Start: 2024-06-03

## 2024-06-13 NOTE — ED PROVIDER NOTES
[facility] NOTE     Admit Date: 2024  [facility] Physician: Wilver Morales  Primary OBGYN: Dr. Lewis Knight    Admit Diagnosis/Chief Complaint: Leakage of Fluid      No chief complaint on file.      Hospital Course:  Arabella Toribio is a 28 y.o.  at 37w2d presents complaining of Leaking of Fluid      Patient states no complications in this pregnancy.     Patient denies vaginal bleeding and contractions.  Fetal Movement: normal.    /83   Pulse 87   Temp 98.1 °F (36.7 °C)   Resp 18   LMP 2023   SpO2 97%   Temp:  [98.1 °F (36.7 °C)] 98.1 °F (36.7 °C)  Pulse:  [80-87] 87  Resp:  [18] 18  SpO2:  [97 %-98 %] 97 %  BP: (127)/(83) 127/83    General: in no apparent distress  Abdominal: soft, nontender, nondistended, no abnormal masses, no epigastric pain FHT present  Back: lumbar tenderness absent   CVA tenderness none  Extremeties no redness or tenderness in the calves or thighs no edema    SSE:   SVE:SVE (PeriWATCH)  Dilation (cm): 0  Cervical Position: Posterior  Examined by:: NATHANIEL Larry RN  Chaperone in room: KOJO Marin RN       ROM PLUS negative    FHT:  Reactive  TOCO: Contractions irregular patient comfortable      LABS:   No results found for this or any previous visit (from the past 24 hour(s)).  [unfilled]     Imaging Results    None          ASSESMENT: Arabella Toribio is a 28 y.o.   at 37w2d rule out rupture of membranes  Observation in [facility]  Low suspicion of rupture of membranes   Labor precautions reviewed with patient  ER precautions reviewed with patient  Patient was given an opportunity to ask questions  Patient is to follow-up with her primary care physician        Discharge Diagnosis/Clinical Impression**: Rule out Spontaneous Rupture Of Membranes    Status:Stable    Patient Instructions:       - Pt was given routine pregnancy instructions including to return to triage if she had any vaginal bleeding (other than spotting for the next 48hrs), any loss of fluid  like her water broke, decreased fetal movement, or contractions Q 5min lasting for 2 or more hours. Pt was also instructed to drink copious water. Patient voiced understanding of all these instructions and was subsequently discharged home.    She will follow up with her primary OB.        This note was created with the assistance of [employer] voice recognition software. There may be transcription errors as a result of using this technology however minimal. Effort has been made to assure accuracy of transcription but any obvious errors or omissions should be clarified with the author of the document.

## 2024-06-27 ENCOUNTER — HOSPITAL ENCOUNTER (INPATIENT)
Facility: HOSPITAL | Age: 29
LOS: 2 days | Discharge: HOME | End: 2024-06-29
Attending: OBSTETRICS & GYNECOLOGY | Admitting: OBSTETRICS & GYNECOLOGY
Payer: MEDICAID

## 2024-06-27 DIAGNOSIS — Z34.90 ENCOUNTER FOR INDUCTION OF LABOR: Primary | ICD-10-CM

## 2024-06-27 LAB
BASOPHILS # BLD AUTO: 0.02 X10(3)/MCL
BASOPHILS NFR BLD AUTO: 0.2 %
EOSINOPHIL # BLD AUTO: 0.1 X10(3)/MCL (ref 0–0.9)
EOSINOPHIL NFR BLD AUTO: 1.2 %
ERYTHROCYTE [DISTWIDTH] IN BLOOD BY AUTOMATED COUNT: 14.9 % (ref 11.5–17)
GROUP & RH: NORMAL
HCT VFR BLD AUTO: 35.1 % (ref 37–47)
HGB BLD-MCNC: 11.7 G/DL (ref 12–16)
IMM GRANULOCYTES # BLD AUTO: 0.03 X10(3)/MCL (ref 0–0.04)
IMM GRANULOCYTES NFR BLD AUTO: 0.4 %
INDIRECT COOMBS: NORMAL
LYMPHOCYTES # BLD AUTO: 1.98 X10(3)/MCL (ref 0.6–4.6)
LYMPHOCYTES NFR BLD AUTO: 24.3 %
MCH RBC QN AUTO: 31.7 PG (ref 27–31)
MCHC RBC AUTO-ENTMCNC: 33.3 G/DL (ref 33–36)
MCV RBC AUTO: 95.1 FL (ref 80–94)
MONOCYTES # BLD AUTO: 0.51 X10(3)/MCL (ref 0.1–1.3)
MONOCYTES NFR BLD AUTO: 6.3 %
NEUTROPHILS # BLD AUTO: 5.52 X10(3)/MCL (ref 2.1–9.2)
NEUTROPHILS NFR BLD AUTO: 67.6 %
NRBC BLD AUTO-RTO: 0 %
PLATELET # BLD AUTO: 162 X10(3)/MCL (ref 130–400)
PMV BLD AUTO: 12.4 FL (ref 7.4–10.4)
RBC # BLD AUTO: 3.69 X10(6)/MCL (ref 4.2–5.4)
SPECIMEN OUTDATE: NORMAL
T PALLIDUM AB SER QL: NONREACTIVE
WBC # BLD AUTO: 8.16 X10(3)/MCL (ref 4.5–11.5)

## 2024-06-27 PROCEDURE — 86780 TREPONEMA PALLIDUM: CPT | Performed by: OBSTETRICS & GYNECOLOGY

## 2024-06-27 PROCEDURE — 63600175 PHARM REV CODE 636 W HCPCS: Performed by: OBSTETRICS & GYNECOLOGY

## 2024-06-27 PROCEDURE — 86901 BLOOD TYPING SEROLOGIC RH(D): CPT | Performed by: OBSTETRICS & GYNECOLOGY

## 2024-06-27 PROCEDURE — 86900 BLOOD TYPING SEROLOGIC ABO: CPT | Performed by: OBSTETRICS & GYNECOLOGY

## 2024-06-27 PROCEDURE — 85025 COMPLETE CBC W/AUTO DIFF WBC: CPT | Performed by: OBSTETRICS & GYNECOLOGY

## 2024-06-27 PROCEDURE — 11000001 HC ACUTE MED/SURG PRIVATE ROOM

## 2024-06-27 PROCEDURE — 25000003 PHARM REV CODE 250: Performed by: OBSTETRICS & GYNECOLOGY

## 2024-06-27 RX ORDER — OXYTOCIN-SODIUM CHLORIDE 0.9% IV SOLN 30 UNIT/500ML 30-0.9/5 UT/ML-%
95 SOLUTION INTRAVENOUS ONCE AS NEEDED
Status: DISCONTINUED | OUTPATIENT
Start: 2024-06-27 | End: 2024-06-28

## 2024-06-27 RX ORDER — DIPHENOXYLATE HYDROCHLORIDE AND ATROPINE SULFATE 2.5; .025 MG/1; MG/1
2 TABLET ORAL EVERY 6 HOURS PRN
Status: DISCONTINUED | OUTPATIENT
Start: 2024-06-27 | End: 2024-06-28

## 2024-06-27 RX ORDER — CALCIUM CARBONATE 200(500)MG
500 TABLET,CHEWABLE ORAL 3 TIMES DAILY PRN
Status: DISCONTINUED | OUTPATIENT
Start: 2024-06-27 | End: 2024-06-29 | Stop reason: HOSPADM

## 2024-06-27 RX ORDER — OXYTOCIN 10 [USP'U]/ML
10 INJECTION, SOLUTION INTRAMUSCULAR; INTRAVENOUS ONCE AS NEEDED
Status: DISCONTINUED | OUTPATIENT
Start: 2024-06-27 | End: 2024-06-28

## 2024-06-27 RX ORDER — MISOPROSTOL 100 UG/1
800 TABLET ORAL ONCE AS NEEDED
Status: DISCONTINUED | OUTPATIENT
Start: 2024-06-27 | End: 2024-06-28

## 2024-06-27 RX ORDER — SIMETHICONE 80 MG
1 TABLET,CHEWABLE ORAL 4 TIMES DAILY PRN
Status: DISCONTINUED | OUTPATIENT
Start: 2024-06-27 | End: 2024-06-28

## 2024-06-27 RX ORDER — OXYTOCIN-SODIUM CHLORIDE 0.9% IV SOLN 30 UNIT/500ML 30-0.9/5 UT/ML-%
10 SOLUTION INTRAVENOUS ONCE AS NEEDED
Status: COMPLETED | OUTPATIENT
Start: 2024-06-27 | End: 2024-06-28

## 2024-06-27 RX ORDER — OXYTOCIN-SODIUM CHLORIDE 0.9% IV SOLN 30 UNIT/500ML 30-0.9/5 UT/ML-%
95 SOLUTION INTRAVENOUS ONCE
Status: DISCONTINUED | OUTPATIENT
Start: 2024-06-27 | End: 2024-06-29 | Stop reason: HOSPADM

## 2024-06-27 RX ORDER — CARBOPROST TROMETHAMINE 250 UG/ML
250 INJECTION, SOLUTION INTRAMUSCULAR
Status: DISCONTINUED | OUTPATIENT
Start: 2024-06-27 | End: 2024-06-28

## 2024-06-27 RX ORDER — METHYLERGONOVINE MALEATE 0.2 MG/ML
200 INJECTION INTRAVENOUS ONCE AS NEEDED
Status: COMPLETED | OUTPATIENT
Start: 2024-06-27 | End: 2024-06-28

## 2024-06-27 RX ORDER — OXYTOCIN-SODIUM CHLORIDE 0.9% IV SOLN 30 UNIT/500ML 30-0.9/5 UT/ML-%
10 SOLUTION INTRAVENOUS ONCE
Status: DISCONTINUED | OUTPATIENT
Start: 2024-06-27 | End: 2024-06-29 | Stop reason: HOSPADM

## 2024-06-27 RX ORDER — MUPIROCIN 20 MG/G
OINTMENT TOPICAL
Status: DISCONTINUED | OUTPATIENT
Start: 2024-06-27 | End: 2024-06-29 | Stop reason: HOSPADM

## 2024-06-27 RX ORDER — LIDOCAINE HYDROCHLORIDE 10 MG/ML
10 INJECTION INFILTRATION; PERINEURAL ONCE AS NEEDED
Status: DISCONTINUED | OUTPATIENT
Start: 2024-06-27 | End: 2024-06-29 | Stop reason: HOSPADM

## 2024-06-27 RX ORDER — ONDANSETRON 4 MG/1
8 TABLET, ORALLY DISINTEGRATING ORAL EVERY 8 HOURS PRN
Status: DISCONTINUED | OUTPATIENT
Start: 2024-06-27 | End: 2024-06-28

## 2024-06-27 RX ORDER — SODIUM CHLORIDE, SODIUM LACTATE, POTASSIUM CHLORIDE, CALCIUM CHLORIDE 600; 310; 30; 20 MG/100ML; MG/100ML; MG/100ML; MG/100ML
INJECTION, SOLUTION INTRAVENOUS CONTINUOUS
Status: DISCONTINUED | OUTPATIENT
Start: 2024-06-27 | End: 2024-06-29 | Stop reason: HOSPADM

## 2024-06-27 RX ADMIN — DINOPROSTONE 10 MG: 10 INSERT VAGINAL at 09:06

## 2024-06-27 RX ADMIN — SODIUM CHLORIDE, POTASSIUM CHLORIDE, SODIUM LACTATE AND CALCIUM CHLORIDE: 600; 310; 30; 20 INJECTION, SOLUTION INTRAVENOUS at 08:06

## 2024-06-28 PROCEDURE — 72100003 HC LABOR CARE, EA. ADDL. 8 HRS

## 2024-06-28 PROCEDURE — 90715 TDAP VACCINE 7 YRS/> IM: CPT | Performed by: OBSTETRICS & GYNECOLOGY

## 2024-06-28 PROCEDURE — 63600175 PHARM REV CODE 636 W HCPCS: Performed by: OBSTETRICS & GYNECOLOGY

## 2024-06-28 PROCEDURE — 90471 IMMUNIZATION ADMIN: CPT | Performed by: OBSTETRICS & GYNECOLOGY

## 2024-06-28 PROCEDURE — 3E0234Z INTRODUCTION OF SERUM, TOXOID AND VACCINE INTO MUSCLE, PERCUTANEOUS APPROACH: ICD-10-PCS | Performed by: OBSTETRICS & GYNECOLOGY

## 2024-06-28 PROCEDURE — 72100002 HC LABOR CARE, 1ST 8 HOURS

## 2024-06-28 PROCEDURE — 25000003 PHARM REV CODE 250: Performed by: OBSTETRICS & GYNECOLOGY

## 2024-06-28 PROCEDURE — 11000001 HC ACUTE MED/SURG PRIVATE ROOM

## 2024-06-28 PROCEDURE — 3E0P7VZ INTRODUCTION OF HORMONE INTO FEMALE REPRODUCTIVE, VIA NATURAL OR ARTIFICIAL OPENING: ICD-10-PCS | Performed by: OBSTETRICS & GYNECOLOGY

## 2024-06-28 PROCEDURE — 72200004 HC VAGINAL DELIVERY LEVEL I

## 2024-06-28 RX ORDER — DOCUSATE SODIUM 100 MG/1
200 CAPSULE, LIQUID FILLED ORAL 2 TIMES DAILY PRN
Status: DISCONTINUED | OUTPATIENT
Start: 2024-06-28 | End: 2024-06-29 | Stop reason: HOSPADM

## 2024-06-28 RX ORDER — OXYTOCIN-SODIUM CHLORIDE 0.9% IV SOLN 30 UNIT/500ML 30-0.9/5 UT/ML-%
95 SOLUTION INTRAVENOUS ONCE AS NEEDED
Status: DISCONTINUED | OUTPATIENT
Start: 2024-06-28 | End: 2024-06-29 | Stop reason: HOSPADM

## 2024-06-28 RX ORDER — ONDANSETRON 4 MG/1
8 TABLET, ORALLY DISINTEGRATING ORAL EVERY 8 HOURS PRN
Status: DISCONTINUED | OUTPATIENT
Start: 2024-06-28 | End: 2024-06-29 | Stop reason: HOSPADM

## 2024-06-28 RX ORDER — OXYCODONE AND ACETAMINOPHEN 5; 325 MG/1; MG/1
1 TABLET ORAL EVERY 4 HOURS PRN
Status: DISCONTINUED | OUTPATIENT
Start: 2024-06-28 | End: 2024-06-29 | Stop reason: HOSPADM

## 2024-06-28 RX ORDER — DIPHENHYDRAMINE HYDROCHLORIDE 50 MG/ML
25 INJECTION INTRAMUSCULAR; INTRAVENOUS EVERY 4 HOURS PRN
Status: DISCONTINUED | OUTPATIENT
Start: 2024-06-28 | End: 2024-06-29 | Stop reason: HOSPADM

## 2024-06-28 RX ORDER — ONDANSETRON HYDROCHLORIDE 2 MG/ML
4 INJECTION, SOLUTION INTRAVENOUS ONCE
Status: DISCONTINUED | OUTPATIENT
Start: 2024-06-28 | End: 2024-06-29 | Stop reason: HOSPADM

## 2024-06-28 RX ORDER — DIPHENOXYLATE HYDROCHLORIDE AND ATROPINE SULFATE 2.5; .025 MG/1; MG/1
2 TABLET ORAL EVERY 6 HOURS PRN
Status: DISCONTINUED | OUTPATIENT
Start: 2024-06-28 | End: 2024-06-29 | Stop reason: HOSPADM

## 2024-06-28 RX ORDER — LABETALOL 100 MG/1
100 TABLET, FILM COATED ORAL EVERY 12 HOURS
Status: DISCONTINUED | OUTPATIENT
Start: 2024-06-28 | End: 2024-06-29 | Stop reason: HOSPADM

## 2024-06-28 RX ORDER — METHYLERGONOVINE MALEATE 0.2 MG/ML
200 INJECTION INTRAVENOUS ONCE AS NEEDED
Status: DISCONTINUED | OUTPATIENT
Start: 2024-06-28 | End: 2024-06-29 | Stop reason: HOSPADM

## 2024-06-28 RX ORDER — OXYCODONE HYDROCHLORIDE 5 MG/1
10 TABLET ORAL EVERY 6 HOURS PRN
Status: DISCONTINUED | OUTPATIENT
Start: 2024-06-28 | End: 2024-06-29 | Stop reason: HOSPADM

## 2024-06-28 RX ORDER — OXYTOCIN-SODIUM CHLORIDE 0.9% IV SOLN 30 UNIT/500ML 30-0.9/5 UT/ML-%
10 SOLUTION INTRAVENOUS ONCE AS NEEDED
Status: DISCONTINUED | OUTPATIENT
Start: 2024-06-28 | End: 2024-06-29 | Stop reason: HOSPADM

## 2024-06-28 RX ORDER — OXYCODONE AND ACETAMINOPHEN 10; 325 MG/1; MG/1
1 TABLET ORAL EVERY 4 HOURS PRN
Status: DISCONTINUED | OUTPATIENT
Start: 2024-06-28 | End: 2024-06-29 | Stop reason: HOSPADM

## 2024-06-28 RX ORDER — CARBOPROST TROMETHAMINE 250 UG/ML
250 INJECTION, SOLUTION INTRAMUSCULAR
Status: DISCONTINUED | OUTPATIENT
Start: 2024-06-28 | End: 2024-06-29 | Stop reason: HOSPADM

## 2024-06-28 RX ORDER — PRENATAL WITH FERROUS FUM AND FOLIC ACID 3080; 920; 120; 400; 22; 1.84; 3; 20; 10; 1; 12; 200; 27; 25; 2 [IU]/1; [IU]/1; MG/1; [IU]/1; MG/1; MG/1; MG/1; MG/1; MG/1; MG/1; UG/1; MG/1; MG/1; MG/1; MG/1
1 TABLET ORAL DAILY
Status: DISCONTINUED | OUTPATIENT
Start: 2024-06-28 | End: 2024-06-29 | Stop reason: HOSPADM

## 2024-06-28 RX ORDER — SODIUM CHLORIDE 0.9 % (FLUSH) 0.9 %
10 SYRINGE (ML) INJECTION
Status: DISCONTINUED | OUTPATIENT
Start: 2024-06-28 | End: 2024-06-29 | Stop reason: HOSPADM

## 2024-06-28 RX ORDER — ACETAMINOPHEN 325 MG/1
650 TABLET ORAL EVERY 6 HOURS SCHEDULED
Status: DISCONTINUED | OUTPATIENT
Start: 2024-06-28 | End: 2024-06-29 | Stop reason: HOSPADM

## 2024-06-28 RX ORDER — FENTANYL/BUPIVACAINE/NS/PF 2-1250MCG
PLASTIC BAG, INJECTION (ML) INJECTION CONTINUOUS
Status: DISCONTINUED | OUTPATIENT
Start: 2024-06-28 | End: 2024-06-29 | Stop reason: HOSPADM

## 2024-06-28 RX ORDER — OXYTOCIN 10 [USP'U]/ML
10 INJECTION, SOLUTION INTRAMUSCULAR; INTRAVENOUS ONCE AS NEEDED
Status: DISCONTINUED | OUTPATIENT
Start: 2024-06-28 | End: 2024-06-29 | Stop reason: HOSPADM

## 2024-06-28 RX ORDER — MISOPROSTOL 100 UG/1
800 TABLET ORAL ONCE AS NEEDED
Status: DISCONTINUED | OUTPATIENT
Start: 2024-06-28 | End: 2024-06-29 | Stop reason: HOSPADM

## 2024-06-28 RX ORDER — DIPHENHYDRAMINE HCL 25 MG
25 CAPSULE ORAL EVERY 4 HOURS PRN
Status: DISCONTINUED | OUTPATIENT
Start: 2024-06-28 | End: 2024-06-29 | Stop reason: HOSPADM

## 2024-06-28 RX ORDER — OXYTOCIN-SODIUM CHLORIDE 0.9% IV SOLN 30 UNIT/500ML 30-0.9/5 UT/ML-%
95 SOLUTION INTRAVENOUS ONCE
Status: COMPLETED | OUTPATIENT
Start: 2024-06-28 | End: 2024-06-28

## 2024-06-28 RX ORDER — HYDROCORTISONE 25 MG/G
CREAM TOPICAL 3 TIMES DAILY PRN
Status: DISCONTINUED | OUTPATIENT
Start: 2024-06-28 | End: 2024-06-29 | Stop reason: HOSPADM

## 2024-06-28 RX ORDER — EPHEDRINE SULFATE 50 MG/ML
10 INJECTION, SOLUTION INTRAVENOUS ONCE AS NEEDED
Status: DISCONTINUED | OUTPATIENT
Start: 2024-06-28 | End: 2024-06-29 | Stop reason: HOSPADM

## 2024-06-28 RX ORDER — IBUPROFEN 600 MG/1
600 TABLET ORAL EVERY 6 HOURS PRN
Status: DISCONTINUED | OUTPATIENT
Start: 2024-06-28 | End: 2024-06-29 | Stop reason: HOSPADM

## 2024-06-28 RX ORDER — SODIUM CITRATE AND CITRIC ACID MONOHYDRATE 334; 500 MG/5ML; MG/5ML
30 SOLUTION ORAL ONCE
Status: DISCONTINUED | OUTPATIENT
Start: 2024-06-28 | End: 2024-06-29 | Stop reason: HOSPADM

## 2024-06-28 RX ORDER — SIMETHICONE 80 MG
1 TABLET,CHEWABLE ORAL EVERY 6 HOURS PRN
Status: DISCONTINUED | OUTPATIENT
Start: 2024-06-28 | End: 2024-06-29 | Stop reason: HOSPADM

## 2024-06-28 RX ADMIN — OXYCODONE HYDROCHLORIDE AND ACETAMINOPHEN 1 TABLET: 10; 325 TABLET ORAL at 10:06

## 2024-06-28 RX ADMIN — IBUPROFEN 600 MG: 600 TABLET, FILM COATED ORAL at 02:06

## 2024-06-28 RX ADMIN — TETANUS TOXOID, REDUCED DIPHTHERIA TOXOID AND ACELLULAR PERTUSSIS VACCINE, ADSORBED 0.5 ML: 5; 2.5; 8; 8; 2.5 SUSPENSION INTRAMUSCULAR at 05:06

## 2024-06-28 RX ADMIN — BENZOCAINE AND LEVOMENTHOL: 200; 5 SPRAY TOPICAL at 08:06

## 2024-06-28 RX ADMIN — SODIUM CHLORIDE, POTASSIUM CHLORIDE, SODIUM LACTATE AND CALCIUM CHLORIDE: 600; 310; 30; 20 INJECTION, SOLUTION INTRAVENOUS at 01:06

## 2024-06-28 RX ADMIN — Medication 95 MILLI-UNITS/MIN: at 06:06

## 2024-06-28 RX ADMIN — METHYLERGONOVINE MALEATE 200 MCG: 0.2 INJECTION, SOLUTION INTRAMUSCULAR; INTRAVENOUS at 05:06

## 2024-06-28 RX ADMIN — IBUPROFEN 600 MG: 600 TABLET, FILM COATED ORAL at 08:06

## 2024-06-28 RX ADMIN — LABETALOL HYDROCHLORIDE 100 MG: 100 TABLET, FILM COATED ORAL at 09:06

## 2024-06-28 RX ADMIN — LABETALOL HYDROCHLORIDE 100 MG: 100 TABLET, FILM COATED ORAL at 08:06

## 2024-06-28 RX ADMIN — IBUPROFEN 600 MG: 600 TABLET, FILM COATED ORAL at 09:06

## 2024-06-28 RX ADMIN — Medication 10 UNITS: at 05:06

## 2024-06-28 NOTE — L&D DELIVERY NOTE
[facility] - Labor and Delivery  Vaginal Delivery   Operative Note    SUMMARY     Normal spontaneous vaginal delivery of live infant, was placed on mothers abdomen for skin to skin and bulb suctioning performed.  Infant delivered position OA over intact perineum.  Nuchal cord: No.    Spontaneous delivery of placenta and IV pitocin given noting good uterine tone.  No lacerations noted.  Patient tolerated delivery well. Sponge needle and lap counted correctly x2.    Indications: iol term elective  Pregnancy complicated by:   Patient Active Problem List   Diagnosis    History of  premature rupture of membranes (PROM) in previous pregnancy, currently pregnant in second trimester    Short interval between pregnancies affecting pregnancy, antepartum    BMI 35 at initial M consultation    At high risk for complications of intrauterine pregnancy (IUP)    Increased BMI affecting pregnancy in second trimester    Excessive weight gain during pregnancy in second trimester     Admitting GA: 39w4d    This patient has no babies on file.

## 2024-06-28 NOTE — H&P
[facility] - Labor and Delivery  Obstetrics  History & Physical    Patient Name: Arabella Toribio  MRN: [id number]  Admission Date: 2024  Primary Care Provider: Elicia King NP    Subjective:     Principal Problem:elective iol     History of Present Illness: term iup    Obstetric HPI:  Patient reports None contractions, active fetal movement, No vaginal bleeding , No loss of fluid     This pregnancy has been complicated by na    OB History    Para Term  AB Living   3 2 1 1 0 2   SAB IAB Ectopic Multiple Live Births   0 0 0 0 2      # Outcome Date GA Lbr Yovany/2nd Weight Sex Type Anes PTL Lv   3 Current            2 Term 23 38w0d 06:08 / 01:00 3.68 kg (8 lb 1.8 oz) M Vag-Spont EPI N RAEANN      Name: TREVOR TORIBIO      Apgar1: 8  Apgar5: 9   1  10/15/17 29w0d   M Vag-Spont   RAEANN     History reviewed. No pertinent past medical history.  Past Surgical History:   Procedure Laterality Date    BREAST CYST INCISION AND DRAINAGE         PTA Medications   Medication Sig    FEROSUL 325 mg (65 mg iron) Tab tablet Take by mouth.    ferrous sulfate 325 (65 FE) MG EC tablet Take 1 tablet (325 mg total) by mouth once daily.    PNV,calcium 72-iron,carb-folic (PRENATAL PLUS) 29 mg iron- 1 mg Tab Take 1 tablet by mouth once daily.    aspirin (ECOTRIN) 81 MG EC tablet Take 81 mg by mouth once daily.    boric acid (PH-D) 600 mg vaginal suppository Insert 1 supp. Vaginally every night at bedtime for 7 days and then insert 1 supp. Vaginally twice weekly as needed (Patient not taking: Reported on 2023)    metroNIDAZOLE (METROGEL) 0.75 % (37.5mg/5 gram) vaginal gel Place 1 applicator vaginally every evening. (Patient not taking: Reported on 2023)    ondansetron (ZOFRAN-ODT) 4 MG TbDL Take 1 tablet (4 mg total) by mouth 2 (two) times daily.    oxyCODONE-acetaminophen (PERCOCET) 5-325 mg per tablet Take 1 tablet by mouth every 6 (six) hours as needed for Pain. (Patient not  taking: Reported on 2024)    prenatal 93-iron-folate 9-dha (TRISTART DHA) 31 mg iron- 1 mg-200 mg Cap Take 1 capsule daily (Patient not taking: Reported on 2024)    progesterone (PROMETRIUM) 200 MG capsule Take 200 mg by mouth every evening.    valACYclovir (VALTREX) 500 MG tablet Take 1 tablet (500 mg total) by mouth 2 (two) times daily. for 3 days (Patient not taking: Reported on 2024)       Review of patient's allergies indicates:  No Known Allergies     Family History       Problem Relation (Age of Onset)    Lung cancer Maternal Grandmother, Maternal Grandfather, Other          Tobacco Use    Smoking status: Never    Smokeless tobacco: Never   Substance and Sexual Activity    Alcohol use: Not Currently    Drug use: Never    Sexual activity: Yes     Review of Systems   Objective:     Vital Signs (Most Recent):  Temp: 97.8 °F (36.6 °C) (24 041)  Pulse: 75 (24 0452)  Resp: 16 (24 041)  BP: 117/66 (24 0411)  SpO2: (!) 94 % (24 0452) Vital Signs (24h Range):  Temp:  [97.7 °F (36.5 °C)-97.8 °F (36.6 °C)] 97.8 °F (36.6 °C)  Pulse:  [] 75  Resp:  [14-16] 16  SpO2:  [91 %-99 %] 94 %  BP: (116-118)/(66-76) 117/66     Weight: 104.3 kg (230 lb)  Body mass index is 38.27 kg/m².    FHT: 150Cat 1 (reassuring)  TOCO:  Q 0 minutes    Physical Exam    Cervix:  Dilation:  1  Effacement:  50%  Station: -3  Presentation: Vertex     Significant Labs:  Lab Results   Component Value Date    GROUPTRH A POS 2024    HEPBSAG Nonreactive 2024    STREPBCULT negative 2024       I have personallly reviewed all pertinent lab results from the last 24 hours.    Assessment/Plan:     28 y.o. female  at 39w4d for:    There are no hospital problems to display for this patient.      Admit for iol  Cervidil      Lewis Knight MD  Obstetrics  [facility] - Labor and Delivery

## 2024-06-29 VITALS
OXYGEN SATURATION: 98 % | BODY MASS INDEX: 38.32 KG/M2 | DIASTOLIC BLOOD PRESSURE: 81 MMHG | SYSTOLIC BLOOD PRESSURE: 118 MMHG | RESPIRATION RATE: 18 BRPM | HEIGHT: 65 IN | TEMPERATURE: 98 F | WEIGHT: 230 LBS | HEART RATE: 81 BPM

## 2024-06-29 PROCEDURE — 25000003 PHARM REV CODE 250: Performed by: OBSTETRICS & GYNECOLOGY

## 2024-06-29 RX ORDER — IBUPROFEN 600 MG/1
600 TABLET ORAL EVERY 6 HOURS PRN
Qty: 28 TABLET | Refills: 0 | Status: SHIPPED | OUTPATIENT
Start: 2024-06-29

## 2024-06-29 RX ORDER — MEDROXYPROGESTERONE ACETATE 150 MG/ML
150 INJECTION, SUSPENSION INTRAMUSCULAR ONCE
Status: COMPLETED | OUTPATIENT
Start: 2024-06-29 | End: 2024-06-29

## 2024-06-29 RX ADMIN — DOCUSATE SODIUM 200 MG: 100 CAPSULE, LIQUID FILLED ORAL at 09:06

## 2024-06-29 RX ADMIN — LABETALOL HYDROCHLORIDE 100 MG: 100 TABLET, FILM COATED ORAL at 09:06

## 2024-06-29 RX ADMIN — OXYCODONE HYDROCHLORIDE AND ACETAMINOPHEN 1 TABLET: 10; 325 TABLET ORAL at 01:06

## 2024-06-29 RX ADMIN — IBUPROFEN 600 MG: 600 TABLET, FILM COATED ORAL at 09:06

## 2024-06-29 RX ADMIN — PRENATAL VITAMINS-IRON FUMARATE 27 MG IRON-FOLIC ACID 0.8 MG TABLET 1 TABLET: at 09:06

## 2024-06-29 RX ADMIN — MEDROXYPROGESTERONE ACETATE 150 MG: 150 INJECTION, SUSPENSION INTRAMUSCULAR at 12:06

## 2024-06-29 NOTE — DISCHARGE SUMMARY
"[facility] - 3rd Floor Mother/Baby Unit  Obstetrics  Discharge Summary      Patient Name: Arabella Toribio  MRN: [id number]  Admission Date: 2024  Hospital Length of Stay: 2 days  Discharge Date and Time:  2024 10:23 AM  Attending Physician: Aime Knight MD   Discharging Provider: Aime Knight MD  Primary Care Provider: Elicia King NP    HPI:     * No surgery found *     Hospital Course: stable    Consults (From admission, onward)          Status Ordering Provider     Inpatient consult to Anesthesiology  Once        Provider:  Aime Knight MD    Acknowledged AIME KNIGHT            Final Active Diagnoses:    Diagnosis Date Noted POA    PRINCIPAL PROBLEM:  Encounter for induction of labor [Z34.90] 2023 Not Applicable      Problems Resolved During this Admission:        Labs: All labs within the past 24 hours have been reviewed    Feeding Method: both breast and bottle    Immunizations       Date Immunization Status Dose Route/Site Given by    24 0643 MMR Incomplete 0.5 mL Subcutaneous/     24 1740 Tdap Deleted 0.5 mL Intramuscular/     24 1736 Tdap Given 0.5 mL Intramuscular/Left deltoid Love Sosa RN            Delivery:    Episiotomy: None   Lacerations: None   Repair suture: None   Repair # of packets:     Blood loss (ml):       Birth information:  YOB: 2025   Time of birth: 5:25 AM   Sex: male   Delivery type: Vaginal, Spontaneous   Gestational Age: 39w4d     Measurements    Weight: 2977 g  Weight (lbs): 6 lb 9 oz  Length: 45.7 cm  Length (in): 18"  Head circumference: 31.8 cm         Delivery Clinician: Delivery Providers    Delivering clinician: Cy Alexander MD   Provider Role    Sonam Sousa RN Registered Nurse    Maria G Gaona RN Registered Nurse    Adilia Crain RN Registered Nurse    Tho Dave RN Registered Nurse             Additional  information:  Forceps:    Vacuum:    Breech:    Observed " anomalies      Living?:     Apgars    Living status: Living  Apgar Component Scores:  1 min.:  5 min.:  10 min.:  15 min.:  20 min.:    Skin color:  0  1       Heart rate:  2  2       Reflex irritability:  2  2       Muscle tone:  2  2       Respiratory effort:  2  2       Total:  8  9       Apgars assigned by: SETH GONGORA         Placenta: Delivered:       appearance  Pending Diagnostic Studies:       None            Discharged Condition: stable    Disposition: Home or Self Care    Follow Up:    Patient Instructions:   No discharge procedures on file.  Medications:  Current Discharge Medication List        START taking these medications    Details   ibuprofen (ADVIL,MOTRIN) 600 MG tablet Take 1 tablet (600 mg total) by mouth every 6 (six) hours as needed (cramping).  Qty: 28 tablet, Refills: 0           CONTINUE these medications which have NOT CHANGED    Details   FEROSUL 325 mg (65 mg iron) Tab tablet Take by mouth.      ferrous sulfate 325 (65 FE) MG EC tablet Take 1 tablet (325 mg total) by mouth once daily.  Qty: 30 tablet, Refills: 3    Associated Diagnoses: Encounter for supervision of high risk pregnancy in third trimester, antepartum      PNV,calcium 72-iron,carb-folic (PRENATAL PLUS) 29 mg iron- 1 mg Tab Take 1 tablet by mouth once daily.  Qty: 30 tablet, Refills: 11    Comments: Okay to change the brand if this brand is not avail or if not covered.  Associated Diagnoses: Encounter for supervision of high risk pregnancy in first trimester, antepartum; Encntr for obs for oth suspected diseases and cond ruled out      aspirin (ECOTRIN) 81 MG EC tablet Take 81 mg by mouth once daily.      boric acid (PH-D) 600 mg vaginal suppository Insert 1 supp. Vaginally every night at bedtime for 7 days and then insert 1 supp. Vaginally twice weekly as needed  Qty: 30 suppository, Refills: 6    Associated Diagnoses: BV (bacterial vaginosis)      metroNIDAZOLE (METROGEL) 0.75 % (37.5mg/5 gram) vaginal gel Place 1  applicator vaginally every evening.  Qty: 70 g, Refills: 0    Associated Diagnoses: BV (bacterial vaginosis)      ondansetron (ZOFRAN-ODT) 4 MG TbDL Take 1 tablet (4 mg total) by mouth 2 (two) times daily.  Qty: 60 tablet, Refills: 1    Associated Diagnoses: Encounter for supervision of normal pregnancy in multigravida in second trimester      oxyCODONE-acetaminophen (PERCOCET) 5-325 mg per tablet Take 1 tablet by mouth every 6 (six) hours as needed for Pain.  Qty: 16 tablet, Refills: 0    Comments: Quantity prescribed more than 7 day supply? Yes, quantity medically necessary      prenatal 93-iron-folate 9-dha (TRISTART DHA) 31 mg iron- 1 mg-200 mg Cap Take 1 capsule daily  Qty: 30 capsule, Refills: 11    Associated Diagnoses: Encounter for supervision of high risk pregnancy in second trimester, antepartum; Encntr for obs for oth suspected diseases and cond ruled out      progesterone (PROMETRIUM) 200 MG capsule Take 200 mg by mouth every evening.      valACYclovir (VALTREX) 500 MG tablet Take 1 tablet (500 mg total) by mouth 2 (two) times daily. for 3 days  Qty: 6 tablet, Refills: 0    Associated Diagnoses: HSV infection             Lewis Knight MD  Obstetrics  [facility] - 3rd Floor Mother/Baby Unit

## 2025-01-20 PROBLEM — O09.292 HISTORY OF PRETERM PREMATURE RUPTURE OF MEMBRANES (PROM) IN PREVIOUS PREGNANCY, CURRENTLY PREGNANT IN SECOND TRIMESTER: Status: RESOLVED | Noted: 2024-02-08 | Resolved: 2025-01-20

## 2025-05-21 PROCEDURE — 87624 HPV HI-RISK TYP POOLED RSLT: CPT | Performed by: OBSTETRICS & GYNECOLOGY
